# Patient Record
Sex: FEMALE | Race: OTHER | HISPANIC OR LATINO | URBAN - METROPOLITAN AREA
[De-identification: names, ages, dates, MRNs, and addresses within clinical notes are randomized per-mention and may not be internally consistent; named-entity substitution may affect disease eponyms.]

---
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
## 2018-06-16 ENCOUNTER — TRANSCRIPTION ENCOUNTER (OUTPATIENT)
Age: 18
End: 2018-06-16

## 2018-06-16 ENCOUNTER — INPATIENT (INPATIENT)
Facility: HOSPITAL | Age: 18
LOS: 2 days | Discharge: HOME | End: 2018-06-19
Attending: SURGERY | Admitting: SURGERY
Payer: MEDICAID

## 2018-06-16 VITALS
RESPIRATION RATE: 16 BRPM | DIASTOLIC BLOOD PRESSURE: 74 MMHG | HEART RATE: 109 BPM | OXYGEN SATURATION: 100 % | SYSTOLIC BLOOD PRESSURE: 126 MMHG | TEMPERATURE: 98 F

## 2018-06-16 DIAGNOSIS — K80.20 CALCULUS OF GALLBLADDER WITHOUT CHOLECYSTITIS WITHOUT OBSTRUCTION: ICD-10-CM

## 2018-06-16 LAB
ALBUMIN SERPL ELPH-MCNC: 4.3 G/DL — SIGNIFICANT CHANGE UP (ref 3.3–5)
ALP SERPL-CCNC: 56 U/L — SIGNIFICANT CHANGE UP (ref 40–120)
ALT FLD-CCNC: 21 U/L — SIGNIFICANT CHANGE UP (ref 4–33)
AST SERPL-CCNC: 17 U/L — SIGNIFICANT CHANGE UP (ref 4–32)
BASOPHILS # BLD AUTO: 0.02 K/UL — SIGNIFICANT CHANGE UP (ref 0–0.2)
BASOPHILS NFR BLD AUTO: 0.3 % — SIGNIFICANT CHANGE UP (ref 0–2)
BILIRUB SERPL-MCNC: 0.4 MG/DL — SIGNIFICANT CHANGE UP (ref 0.2–1.2)
BUN SERPL-MCNC: 11 MG/DL — SIGNIFICANT CHANGE UP (ref 7–23)
CALCIUM SERPL-MCNC: 9.3 MG/DL — SIGNIFICANT CHANGE UP (ref 8.4–10.5)
CHLORIDE SERPL-SCNC: 100 MMOL/L — SIGNIFICANT CHANGE UP (ref 98–107)
CO2 SERPL-SCNC: 25 MMOL/L — SIGNIFICANT CHANGE UP (ref 22–31)
CREAT SERPL-MCNC: 0.82 MG/DL — SIGNIFICANT CHANGE UP (ref 0.5–1.3)
EOSINOPHIL # BLD AUTO: 0.06 K/UL — SIGNIFICANT CHANGE UP (ref 0–0.5)
EOSINOPHIL NFR BLD AUTO: 0.9 % — SIGNIFICANT CHANGE UP (ref 0–6)
GLUCOSE SERPL-MCNC: 94 MG/DL — SIGNIFICANT CHANGE UP (ref 70–99)
HCG SERPL-ACNC: < 5 MIU/ML — SIGNIFICANT CHANGE UP
HCT VFR BLD CALC: 39.3 % — SIGNIFICANT CHANGE UP (ref 34.5–45)
HGB BLD-MCNC: 12.5 G/DL — SIGNIFICANT CHANGE UP (ref 11.5–15.5)
HIV COMBO RESULT: SIGNIFICANT CHANGE UP
HIV1+2 AB SPEC QL: SIGNIFICANT CHANGE UP
IMM GRANULOCYTES # BLD AUTO: 0.02 # — SIGNIFICANT CHANGE UP
IMM GRANULOCYTES NFR BLD AUTO: 0.3 % — SIGNIFICANT CHANGE UP (ref 0–1.5)
LIDOCAIN IGE QN: 39.8 U/L — SIGNIFICANT CHANGE UP (ref 7–60)
LYMPHOCYTES # BLD AUTO: 1.19 K/UL — SIGNIFICANT CHANGE UP (ref 1–3.3)
LYMPHOCYTES # BLD AUTO: 17.5 % — SIGNIFICANT CHANGE UP (ref 13–44)
MCHC RBC-ENTMCNC: 28.2 PG — SIGNIFICANT CHANGE UP (ref 27–34)
MCHC RBC-ENTMCNC: 31.8 % — LOW (ref 32–36)
MCV RBC AUTO: 88.5 FL — SIGNIFICANT CHANGE UP (ref 80–100)
MONOCYTES # BLD AUTO: 0.36 K/UL — SIGNIFICANT CHANGE UP (ref 0–0.9)
MONOCYTES NFR BLD AUTO: 5.3 % — SIGNIFICANT CHANGE UP (ref 2–14)
NEUTROPHILS # BLD AUTO: 5.15 K/UL — SIGNIFICANT CHANGE UP (ref 1.8–7.4)
NEUTROPHILS NFR BLD AUTO: 75.7 % — SIGNIFICANT CHANGE UP (ref 43–77)
NRBC # FLD: 0 — SIGNIFICANT CHANGE UP
PLATELET # BLD AUTO: 185 K/UL — SIGNIFICANT CHANGE UP (ref 150–400)
PMV BLD: 11.2 FL — SIGNIFICANT CHANGE UP (ref 7–13)
POTASSIUM SERPL-MCNC: 4.1 MMOL/L — SIGNIFICANT CHANGE UP (ref 3.5–5.3)
POTASSIUM SERPL-SCNC: 4.1 MMOL/L — SIGNIFICANT CHANGE UP (ref 3.5–5.3)
PROT SERPL-MCNC: 7.5 G/DL — SIGNIFICANT CHANGE UP (ref 6–8.3)
RBC # BLD: 4.44 M/UL — SIGNIFICANT CHANGE UP (ref 3.8–5.2)
RBC # FLD: 12.4 % — SIGNIFICANT CHANGE UP (ref 10.3–14.5)
SODIUM SERPL-SCNC: 139 MMOL/L — SIGNIFICANT CHANGE UP (ref 135–145)
WBC # BLD: 6.8 K/UL — SIGNIFICANT CHANGE UP (ref 3.8–10.5)
WBC # FLD AUTO: 6.8 K/UL — SIGNIFICANT CHANGE UP (ref 3.8–10.5)

## 2018-06-16 PROCEDURE — 76705 ECHO EXAM OF ABDOMEN: CPT | Mod: 26

## 2018-06-16 PROCEDURE — 99221 1ST HOSP IP/OBS SF/LOW 40: CPT | Mod: 57,GC

## 2018-06-16 RX ORDER — ENOXAPARIN SODIUM 100 MG/ML
40 INJECTION SUBCUTANEOUS EVERY 24 HOURS
Qty: 0 | Refills: 0 | Status: DISCONTINUED | OUTPATIENT
Start: 2018-06-16 | End: 2018-06-19

## 2018-06-16 RX ORDER — ONDANSETRON 8 MG/1
4 TABLET, FILM COATED ORAL ONCE
Qty: 0 | Refills: 0 | Status: COMPLETED | OUTPATIENT
Start: 2018-06-16 | End: 2018-06-16

## 2018-06-16 RX ORDER — CEFOTETAN DISODIUM 1 G
2 VIAL (EA) INJECTION EVERY 12 HOURS
Qty: 0 | Refills: 0 | Status: DISCONTINUED | OUTPATIENT
Start: 2018-06-16 | End: 2018-06-17

## 2018-06-16 RX ORDER — ACETAMINOPHEN 500 MG
650 TABLET ORAL EVERY 6 HOURS
Qty: 0 | Refills: 0 | Status: DISCONTINUED | OUTPATIENT
Start: 2018-06-16 | End: 2018-06-17

## 2018-06-16 RX ORDER — MORPHINE SULFATE 50 MG/1
4 CAPSULE, EXTENDED RELEASE ORAL ONCE
Qty: 0 | Refills: 0 | Status: DISCONTINUED | OUTPATIENT
Start: 2018-06-16 | End: 2018-06-16

## 2018-06-16 RX ORDER — SODIUM CHLORIDE 9 MG/ML
1000 INJECTION, SOLUTION INTRAVENOUS
Qty: 0 | Refills: 0 | Status: DISCONTINUED | OUTPATIENT
Start: 2018-06-16 | End: 2018-06-17

## 2018-06-16 RX ORDER — SODIUM CHLORIDE 9 MG/ML
1000 INJECTION INTRAMUSCULAR; INTRAVENOUS; SUBCUTANEOUS ONCE
Qty: 0 | Refills: 0 | Status: COMPLETED | OUTPATIENT
Start: 2018-06-16 | End: 2018-06-16

## 2018-06-16 RX ADMIN — SODIUM CHLORIDE 125 MILLILITER(S): 9 INJECTION, SOLUTION INTRAVENOUS at 16:14

## 2018-06-16 RX ADMIN — SODIUM CHLORIDE 125 MILLILITER(S): 9 INJECTION, SOLUTION INTRAVENOUS at 18:31

## 2018-06-16 RX ADMIN — Medication 100 GRAM(S): at 15:37

## 2018-06-16 RX ADMIN — ONDANSETRON 4 MILLIGRAM(S): 8 TABLET, FILM COATED ORAL at 09:54

## 2018-06-16 RX ADMIN — SODIUM CHLORIDE 2000 MILLILITER(S): 9 INJECTION INTRAMUSCULAR; INTRAVENOUS; SUBCUTANEOUS at 09:54

## 2018-06-16 RX ADMIN — MORPHINE SULFATE 4 MILLIGRAM(S): 50 CAPSULE, EXTENDED RELEASE ORAL at 10:11

## 2018-06-16 RX ADMIN — Medication 650 MILLIGRAM(S): at 18:31

## 2018-06-16 NOTE — H&P ADULT - NSHPPHYSICALEXAM_GEN_ALL_CORE
Vital Signs Last 24 Hrs  T(C): 36.8 (16 Jun 2018 10:11), Max: 36.9 (16 Jun 2018 08:35)  T(F): 98.2 (16 Jun 2018 10:11), Max: 98.5 (16 Jun 2018 08:35)  HR: 104 (16 Jun 2018 10:11) (104 - 109)  BP: 122/72 (16 Jun 2018 10:11) (122/72 - 126/74)  BP(mean): --  RR: 16 (16 Jun 2018 10:11) (16 - 16)  SpO2: 100% (16 Jun 2018 10:11) (100% - 100%)    General: well developed, well nourished, NAD  Neuro: alert and oriented, no focal deficits, moves all extremities spontaneously  HEENT: NCAT, EOMI, anicteric, mucosa moist  Respiratory: airway patent, respirations unlabored  CVS: regular rate and rhythm  Abdomen: soft, nondistended, tender in RUQ, negative Calloway's (recently medicated), no rebound  Extremities: no edema, sensation and movement grossly intact  Skin: warm, dry, appropriate color

## 2018-06-16 NOTE — ED ADULT NURSE NOTE - OBJECTIVE STATEMENT
Pt presents to the ER with c/o abd pain since last night. Alert and oriented x 3, ambulatory. IVL placed. Bloods drawn. Mother at bedside.

## 2018-06-16 NOTE — H&P ADULT - NSHPSOCIALHISTORY_GEN_ALL_CORE
Never smoker, occasional EtOH use (once per month, 4-5 drinks), denies illicit substance use; lives at home with mother and father, works as a , presently in school to become a .

## 2018-06-16 NOTE — H&P ADULT - NSHPLABSRESULTS_GEN_ALL_CORE
Lab             12.5   6.80  )-----------( 185      ( 16 Jun 2018 09:41 )             39.3   06-16    139  |  100  |  11  ----------------------------<  94  4.1   |  25  |  0.82    Ca    9.3      16 Jun 2018 09:41    TPro  7.5  /  Alb  4.3  /  TBili  0.4  /  DBili  x   /  AST  17  /  ALT  21  /  AlkPhos  56  06-16    Imaging  < from: US Abdomen Limited (06.16.18 @ 11:27) >    Gallbladder: Small gallstone at gallbladder neck. Presence of Calloway's sign could not be assessed for as patient was premedicated with analgesics prior to the examination.    < end of copied text >

## 2018-06-16 NOTE — H&P ADULT - HISTORY OF PRESENT ILLNESS
HPI: 17 year old female presenting with 1 day history of abdominal pain, associated with mild nausea and chills, no emesis. Patient states she had pain that started in the epigastrium, and radiated around the R flank into the back. It was sharp and severe, and caused her to be nauseated, without any vomiting. Denies change in bowel habits. Never had pain like this before. No fevers, but now with shaking chills. Denies palpitations, chest pain. Patient states she was recently started on an oral contraceptive (1-2 weeks ago) as part of the workup for her amenorrhea (since February), and was concerned that this may be related.     PMHx: No pertinent past medical history  PSHx: No significant past surgical history    Medications (home): oral contraceptives  Allergies: No Known Allergies  (Intolerances: none)  Social Hx: Never smoker, occasional EtOH use (once per month, 4-5 drinks), denies illicit substance use; lives at home with mother and father, works as a , presently in school to become a .  Family Hx: no known history of bleeding disorder or adverse reaction to anesthesia, patient reports family member with low thyroid    Physical exam significant for abdominal tenderness in the RUQ    Imaging and labs remarkable for cholelithiasis without evidence of biliary dilation, normal hepatic function panel

## 2018-06-16 NOTE — ED ADULT TRIAGE NOTE - CHIEF COMPLAINT QUOTE
c/o right sided abdominal pain that began last night denies n/v/d LMP February states took pregnant test and is negative has irregular periods

## 2018-06-16 NOTE — ED PROVIDER NOTE - OBJECTIVE STATEMENT
16 y/o F no PMHx or PSHx presents to the ED complaining of R upper quadrant pain abdominal pain and mild nausea that began yesterday. Patient states that the pain came on upon wakening up but did not wake her while she was sleeping and became worse while showering. The Patient also states that she has not eaten or drank since the onset of the pain yesterday. Pain is non-radiating and is waxing and waning in severity. Patient endorses irregular menstruations as she takes oral contraceptives pills and finished the dosing recently. Patient has not had her menstrual period. Patient recently traveled to  with no known bad food or symptoms since her return yesterday. Denies fevers/chills, vomiting, dysuria or any other related symptoms.

## 2018-06-16 NOTE — ED PROVIDER NOTE - MEDICAL DECISION MAKING DETAILS
17/ y/o R upper abdomen quadrant pain without fevers/chills, nausea without diarrhea. Will give IV fluids, US of R upper abdomen. Send UA and give pregnancy test.

## 2018-06-16 NOTE — H&P ADULT - ASSESSMENT
17 year old female with cholelithiasis, abdominal tenderness concerning for acute cholecystitis.    - Admit to surgery, B team  - NPO  - IV fluid while NPO  - antibiotics with cefotetan  - will plan on OR for laparoscopic cholecystectomy    Discussed with Dr. Marques  --MILI Bowman, [id number]

## 2018-06-16 NOTE — ED PROVIDER NOTE - NS_ ATTENDINGSCRIBEDETAILS _ED_A_ED_FT
The scribe's documentation has been prepared under my direction and personally reviewed by me, Elza Romero MD, in its entirety. I confirm that the note above accurately reflects all work, treatment, procedures, and medical decision making performed by me.

## 2018-06-16 NOTE — ED PROVIDER NOTE - PROGRESS NOTE DETAILS
ROSALBA LOPEZ, MD: D/W patient given pain resolved and normal labs, surgery will possibly recommend outpatient f/u, but patient would feel more comfortable with surgical evaluation.  Surgery paged. Endorsed from Dr Romero, pt to be admitted to surgery Dr Marques. pt care transferred.

## 2018-06-17 ENCOUNTER — RESULT REVIEW (OUTPATIENT)
Age: 18
End: 2018-06-17

## 2018-06-17 ENCOUNTER — TRANSCRIPTION ENCOUNTER (OUTPATIENT)
Age: 18
End: 2018-06-17

## 2018-06-17 LAB
ALBUMIN SERPL ELPH-MCNC: 3.7 G/DL — SIGNIFICANT CHANGE UP (ref 3.3–5)
ALP SERPL-CCNC: 51 U/L — SIGNIFICANT CHANGE UP (ref 40–120)
ALT FLD-CCNC: 15 U/L — SIGNIFICANT CHANGE UP (ref 4–33)
AST SERPL-CCNC: 14 U/L — SIGNIFICANT CHANGE UP (ref 4–32)
BASOPHILS # BLD AUTO: 0.03 K/UL — SIGNIFICANT CHANGE UP (ref 0–0.2)
BASOPHILS NFR BLD AUTO: 0.5 % — SIGNIFICANT CHANGE UP (ref 0–2)
BILIRUB DIRECT SERPL-MCNC: 0.1 MG/DL — SIGNIFICANT CHANGE UP (ref 0.1–0.2)
BILIRUB SERPL-MCNC: 0.5 MG/DL — SIGNIFICANT CHANGE UP (ref 0.2–1.2)
BLD GP AB SCN SERPL QL: NEGATIVE — SIGNIFICANT CHANGE UP
BUN SERPL-MCNC: 9 MG/DL — SIGNIFICANT CHANGE UP (ref 7–23)
CALCIUM SERPL-MCNC: 8.9 MG/DL — SIGNIFICANT CHANGE UP (ref 8.4–10.5)
CHLORIDE SERPL-SCNC: 101 MMOL/L — SIGNIFICANT CHANGE UP (ref 98–107)
CO2 SERPL-SCNC: 23 MMOL/L — SIGNIFICANT CHANGE UP (ref 22–31)
CREAT SERPL-MCNC: 0.87 MG/DL — SIGNIFICANT CHANGE UP (ref 0.5–1.3)
EOSINOPHIL # BLD AUTO: 0.1 K/UL — SIGNIFICANT CHANGE UP (ref 0–0.5)
EOSINOPHIL NFR BLD AUTO: 1.7 % — SIGNIFICANT CHANGE UP (ref 0–6)
GLUCOSE SERPL-MCNC: 68 MG/DL — LOW (ref 70–99)
HCT VFR BLD CALC: 35.7 % — SIGNIFICANT CHANGE UP (ref 34.5–45)
HGB BLD-MCNC: 11.5 G/DL — SIGNIFICANT CHANGE UP (ref 11.5–15.5)
IMM GRANULOCYTES # BLD AUTO: 0.02 # — SIGNIFICANT CHANGE UP
IMM GRANULOCYTES NFR BLD AUTO: 0.3 % — SIGNIFICANT CHANGE UP (ref 0–1.5)
LYMPHOCYTES # BLD AUTO: 1.82 K/UL — SIGNIFICANT CHANGE UP (ref 1–3.3)
LYMPHOCYTES # BLD AUTO: 31.8 % — SIGNIFICANT CHANGE UP (ref 13–44)
MAGNESIUM SERPL-MCNC: 1.8 MG/DL — SIGNIFICANT CHANGE UP (ref 1.6–2.6)
MCHC RBC-ENTMCNC: 28.7 PG — SIGNIFICANT CHANGE UP (ref 27–34)
MCHC RBC-ENTMCNC: 32.2 % — SIGNIFICANT CHANGE UP (ref 32–36)
MCV RBC AUTO: 89 FL — SIGNIFICANT CHANGE UP (ref 80–100)
MONOCYTES # BLD AUTO: 0.44 K/UL — SIGNIFICANT CHANGE UP (ref 0–0.9)
MONOCYTES NFR BLD AUTO: 7.7 % — SIGNIFICANT CHANGE UP (ref 2–14)
NEUTROPHILS # BLD AUTO: 3.32 K/UL — SIGNIFICANT CHANGE UP (ref 1.8–7.4)
NEUTROPHILS NFR BLD AUTO: 58 % — SIGNIFICANT CHANGE UP (ref 43–77)
NRBC # FLD: 0 — SIGNIFICANT CHANGE UP
PLATELET # BLD AUTO: 169 K/UL — SIGNIFICANT CHANGE UP (ref 150–400)
PMV BLD: 11.6 FL — SIGNIFICANT CHANGE UP (ref 7–13)
POTASSIUM SERPL-MCNC: 4.4 MMOL/L — SIGNIFICANT CHANGE UP (ref 3.5–5.3)
POTASSIUM SERPL-SCNC: 4.4 MMOL/L — SIGNIFICANT CHANGE UP (ref 3.5–5.3)
PROT SERPL-MCNC: 6.4 G/DL — SIGNIFICANT CHANGE UP (ref 6–8.3)
RBC # BLD: 4.01 M/UL — SIGNIFICANT CHANGE UP (ref 3.8–5.2)
RBC # FLD: 12.5 % — SIGNIFICANT CHANGE UP (ref 10.3–14.5)
RH IG SCN BLD-IMP: POSITIVE — SIGNIFICANT CHANGE UP
SODIUM SERPL-SCNC: 139 MMOL/L — SIGNIFICANT CHANGE UP (ref 135–145)
WBC # BLD: 5.73 K/UL — SIGNIFICANT CHANGE UP (ref 3.8–10.5)
WBC # FLD AUTO: 5.73 K/UL — SIGNIFICANT CHANGE UP (ref 3.8–10.5)

## 2018-06-17 PROCEDURE — 47562 LAPAROSCOPIC CHOLECYSTECTOMY: CPT | Mod: GC

## 2018-06-17 PROCEDURE — 88304 TISSUE EXAM BY PATHOLOGIST: CPT | Mod: 26

## 2018-06-17 RX ORDER — OXYCODONE HYDROCHLORIDE 5 MG/1
10 TABLET ORAL EVERY 6 HOURS
Qty: 0 | Refills: 0 | Status: DISCONTINUED | OUTPATIENT
Start: 2018-06-17 | End: 2018-06-19

## 2018-06-17 RX ORDER — ACETAMINOPHEN 500 MG
650 TABLET ORAL EVERY 6 HOURS
Qty: 0 | Refills: 0 | Status: DISCONTINUED | OUTPATIENT
Start: 2018-06-17 | End: 2018-06-19

## 2018-06-17 RX ORDER — HYDROMORPHONE HYDROCHLORIDE 2 MG/ML
0.5 INJECTION INTRAMUSCULAR; INTRAVENOUS; SUBCUTANEOUS
Qty: 0 | Refills: 0 | Status: DISCONTINUED | OUTPATIENT
Start: 2018-06-17 | End: 2018-06-17

## 2018-06-17 RX ORDER — HYDROMORPHONE HYDROCHLORIDE 2 MG/ML
0.5 INJECTION INTRAMUSCULAR; INTRAVENOUS; SUBCUTANEOUS
Qty: 0 | Refills: 0 | Status: DISCONTINUED | OUTPATIENT
Start: 2018-06-17 | End: 2018-06-19

## 2018-06-17 RX ORDER — OXYCODONE HYDROCHLORIDE 5 MG/1
5 TABLET ORAL EVERY 4 HOURS
Qty: 0 | Refills: 0 | Status: DISCONTINUED | OUTPATIENT
Start: 2018-06-17 | End: 2018-06-19

## 2018-06-17 RX ORDER — ONDANSETRON 8 MG/1
4 TABLET, FILM COATED ORAL ONCE
Qty: 0 | Refills: 0 | Status: COMPLETED | OUTPATIENT
Start: 2018-06-17 | End: 2018-06-17

## 2018-06-17 RX ORDER — SODIUM CHLORIDE 9 MG/ML
1000 INJECTION, SOLUTION INTRAVENOUS
Qty: 0 | Refills: 0 | Status: DISCONTINUED | OUTPATIENT
Start: 2018-06-17 | End: 2018-06-17

## 2018-06-17 RX ORDER — ONDANSETRON 8 MG/1
4 TABLET, FILM COATED ORAL ONCE
Qty: 0 | Refills: 0 | Status: DISCONTINUED | OUTPATIENT
Start: 2018-06-17 | End: 2018-06-19

## 2018-06-17 RX ADMIN — Medication 100 GRAM(S): at 03:20

## 2018-06-17 RX ADMIN — ONDANSETRON 4 MILLIGRAM(S): 8 TABLET, FILM COATED ORAL at 00:53

## 2018-06-17 RX ADMIN — SODIUM CHLORIDE 75 MILLILITER(S): 9 INJECTION, SOLUTION INTRAVENOUS at 14:00

## 2018-06-17 RX ADMIN — SODIUM CHLORIDE 125 MILLILITER(S): 9 INJECTION, SOLUTION INTRAVENOUS at 00:00

## 2018-06-17 RX ADMIN — Medication 100 GRAM(S): at 03:21

## 2018-06-17 RX ADMIN — OXYCODONE HYDROCHLORIDE 10 MILLIGRAM(S): 5 TABLET ORAL at 14:44

## 2018-06-17 RX ADMIN — OXYCODONE HYDROCHLORIDE 10 MILLIGRAM(S): 5 TABLET ORAL at 15:30

## 2018-06-17 RX ADMIN — OXYCODONE HYDROCHLORIDE 10 MILLIGRAM(S): 5 TABLET ORAL at 21:24

## 2018-06-17 RX ADMIN — ENOXAPARIN SODIUM 40 MILLIGRAM(S): 100 INJECTION SUBCUTANEOUS at 14:00

## 2018-06-17 RX ADMIN — OXYCODONE HYDROCHLORIDE 10 MILLIGRAM(S): 5 TABLET ORAL at 20:54

## 2018-06-17 NOTE — DISCHARGE NOTE ADULT - CARE PROVIDERS DIRECT ADDRESSES
,[email or web address]_Unknown

## 2018-06-17 NOTE — BRIEF OPERATIVE NOTE - OPERATION/FINDINGS
Procedure: Laparoscopic cholecystectomy    Findings: An infraumbilical incision was made and carried down through the fascia. Additional trocars were placed. The cystic duct and artery were identified and ligated. Gallbladder was removed. Fascia and skin were then closed.

## 2018-06-17 NOTE — DISCHARGE NOTE ADULT - CARE PROVIDER_API CALL
Kd Marques), DieticianNutrition; Surgery; Surgical Critical Care  [address]  [address], [place] [address]  Phone: [phone]  Fax: [phone] Kd Marques), DieticianNutrition; Surgery; Surgical Critical Care  [address]  [address]  [place] [address]  Phone: [phone]  Fax: [phone]    Tram Deleon), DermatologyDermatopathology  [address] [place] [address]  Phone: [phone]  Fax: [phone]

## 2018-06-17 NOTE — DISCHARGE NOTE ADULT - MEDICATION SUMMARY - MEDICATIONS TO TAKE
I will START or STAY ON the medications listed below when I get home from the hospital:    oxyCODONE-acetaminophen 5 mg-325 mg oral tablet  -- 1 tab(s) by mouth every 6 hours, As Needed -for moderate pain MDD:4 tabs   -- Caution federal law prohibits the transfer of this drug to any person other  than the person for whom it was prescribed.  May cause drowsiness.  Alcohol may intensify this effect.  Use care when operating dangerous machinery.  This prescription cannot be refilled.  This product contains acetaminophen.  Do not use  with any other product containing acetaminophen to prevent possible liver damage.  Using more of this medication than prescribed may cause serious breathing problems.    -- Indication: For Pain I will START or STAY ON the medications listed below when I get home from the hospital:    oxyCODONE-acetaminophen 5 mg-325 mg oral tablet  -- 1 tab(s) by mouth every 6 hours, As Needed -for moderate pain MDD:4 tabs   -- Caution federal law prohibits the transfer of this drug to any person other  than the person for whom it was prescribed.  May cause drowsiness.  Alcohol may intensify this effect.  Use care when operating dangerous machinery.  This prescription cannot be refilled.  This product contains acetaminophen.  Do not use  with any other product containing acetaminophen to prevent possible liver damage.  Using more of this medication than prescribed may cause serious breathing problems.    -- Indication: For Pain med    diphenhydrAMINE 25 mg oral capsule  -- 1 cap(s) by mouth once a day (at bedtime)  -- Indication: For rash     ZyrTEC 10 mg oral tablet  -- 1 tab(s) by mouth 2 times a day (in morning and at lunch)  -- Indication: For rash    triamcinolone 0.1% topical cream  -- 1 application on skin 2 times a day  -- Indication: For rash

## 2018-06-17 NOTE — DISCHARGE NOTE ADULT - HOSPITAL COURSE
17 F presents with abdominal pain. Found to have cholelithiasis on ultrasound. Given persistent pain and tenderness, patient underwent a laparoscopic cholecystectomy with Dr. Marques. After surgery, patient was tolerating a diet, ambulating, voiding, and her pain was controlled. Patient was discharged home, and instructed follow up with Dr. Marques within 1 week. 17 F presents with abdominal pain, found to have cholelithiasis on ultrasound. Given persistent pain and tenderness, patient underwent a laparoscopic cholecystectomy with Dr. Marques on 6/17. Patient tolerated procedure well. Pt's diet slowly advanced as tolerated.     Post op pt developed a rash for which Dermatology consulted and recommend Triamcinolone ointment .1% BID. Zyrtec 10 mg BID (AM and lunch time). Benadryl 25 mg QHS. Patient educated on signs of more serious drug reaction including: Eye pain/grittiness, oral lesions, skin sloughing, dysuria/genital lesions, pain on defecation/anal lesions, facial swelling, lymphadenopathy, fevers. Pt to follow-up with Dr. Deleon in [facility] on Thursday as an outpatient     Per Attending patient stable for discharge, Pt tolerating a diet, ambulating, voiding, and her pain was controlled. Patient was discharged home, and instructed follow up with Dr. Marques within 1 week.

## 2018-06-17 NOTE — PROGRESS NOTE ADULT - SUBJECTIVE AND OBJECTIVE BOX
GENERAL SURGERY DAILY PROGRESS NOTE:       Subjective:  Pain controlled overnight however pt still reports RUQ pain. Denies N/V. NPO for today.         Objective:    PE:  Gen: NAD  Abd: soft, ND, TTP in RUQ, no rebound or guarding      Vital Signs Last 24 Hrs  T(C): 37.5 (16 Jun 2018 21:45), Max: 37.5 (16 Jun 2018 21:45)  T(F): 99.5 (16 Jun 2018 21:45), Max: 99.5 (16 Jun 2018 21:45)  HR: 88 (16 Jun 2018 21:45) (70 - 109)  BP: 122/74 (16 Jun 2018 21:45) (104/60 - 126/74)  BP(mean): --  RR: 17 (16 Jun 2018 21:45) (16 - 17)  SpO2: 100% (16 Jun 2018 21:45) (100% - 100%)    I&O's Detail    16 Jun 2018 07:01  -  17 Jun 2018 00:42  --------------------------------------------------------  IN:    lactated ringers: 575 mL  Total IN: 575 mL    OUT:    Voided: 650 mL  Total OUT: 650 mL    Total NET: -75 mL

## 2018-06-17 NOTE — DISCHARGE NOTE ADULT - CARE PLAN
Principal Discharge DX:	Cholecystitis  Goal:	Follow up with your surgeon  Assessment and plan of treatment:	WOUND CARE: The Dermabond on your skin will come off by itself.  BATHING: Please do not submerge wound underwater. You may shower and/or sponge bathe.  ACTIVITY: No heavy lifting or straining. Otherwise, you may return to your usual level of physical activity. If you are taking narcotic pain medication (such as Percocet), do NOT drive a car, operate machinery or make important decisions.  DIET: Return to your usual diet.  NOTIFY YOUR SURGEON IF: You have any bleeding that does not stop, any pus draining from your wound, any fever (over 100.4 F) or chills, persistent nausea/vomiting, persistent diarrhea, or if your pain is not controlled on your discharge pain medications.  FOLLOW-UP:  1. Please call to make a follow-up appointment within one week of discharge with Dr. Marques ([phone]). Principal Discharge DX:	Cholecystitis  Goal:	s/p laparoscopic cholecystectomy  Assessment and plan of treatment:	WOUND CARE: The Dermabond on your skin will come off by itself.  BATHING: Please do not submerge wound underwater. You may shower and/or sponge bathe.  ACTIVITY: No heavy lifting or straining. Otherwise, you may return to your usual level of physical activity. If you are taking narcotic pain medication (such as Percocet), do NOT drive a car, operate machinery or make important decisions.  DIET: Return to your usual diet.  NOTIFY YOUR SURGEON IF: You have any bleeding that does not stop, any pus draining from your wound, any fever (over 100.4 F) or chills, persistent nausea/vomiting, persistent diarrhea, or if your pain is not controlled on your discharge pain medications.  FOLLOW-UP:  1. Please call to make a follow-up appointment within one week of discharge with Dr. Marques ([phone]).  While in the hospital you developed a rash for which Dermatology consulted and recommend topical ointment Triamcinolone ointment .1% to be used twice a day, Zyrtec 10 mg  (AM and lunch time),  Benadryl 25 mg at bedtime. Please follow up with Dermatologist Dr. Deleon on Thursday in [facility], please call [phone] to schedule appointment.  You were educated on signs of more serious drug reaction including Eye pain/grittiness, oral lesions, skin sloughing, genital lesions, pain with urination, pain with bowel movements/anal lesions, facial swelling, swollen glands, or fevers. If you note any of these please contact your doctor and/or return to emergency room

## 2018-06-17 NOTE — DISCHARGE NOTE ADULT - PATIENT PORTAL LINK FT
You can access the [facility] Patient Portal, offered by [facility], by registering with the following website: [email or web address]

## 2018-06-17 NOTE — BRIEF OPERATIVE NOTE - PROCEDURE
<<-----Click on this checkbox to enter Procedure Laparoscopic cholecystectomy  06/17/2018    Active  BONG

## 2018-06-17 NOTE — DISCHARGE NOTE ADULT - PLAN OF CARE
Follow up with your surgeon WOUND CARE: The Dermabond on your skin will come off by itself.  BATHING: Please do not submerge wound underwater. You may shower and/or sponge bathe.  ACTIVITY: No heavy lifting or straining. Otherwise, you may return to your usual level of physical activity. If you are taking narcotic pain medication (such as Percocet), do NOT drive a car, operate machinery or make important decisions.  DIET: Return to your usual diet.  NOTIFY YOUR SURGEON IF: You have any bleeding that does not stop, any pus draining from your wound, any fever (over 100.4 F) or chills, persistent nausea/vomiting, persistent diarrhea, or if your pain is not controlled on your discharge pain medications.  FOLLOW-UP:  1. Please call to make a follow-up appointment within one week of discharge with Dr. Marques ([phone]). WOUND CARE: The Dermabond on your skin will come off by itself.  BATHING: Please do not submerge wound underwater. You may shower and/or sponge bathe.  ACTIVITY: No heavy lifting or straining. Otherwise, you may return to your usual level of physical activity. If you are taking narcotic pain medication (such as Percocet), do NOT drive a car, operate machinery or make important decisions.  DIET: Return to your usual diet.  NOTIFY YOUR SURGEON IF: You have any bleeding that does not stop, any pus draining from your wound, any fever (over 100.4 F) or chills, persistent nausea/vomiting, persistent diarrhea, or if your pain is not controlled on your discharge pain medications.  FOLLOW-UP:  1. Please call to make a follow-up appointment within one week of discharge with Dr. Marques ([phone]).  While in the hospital you developed a rash for which Dermatology consulted and recommend topical ointment Triamcinolone ointment .1% to be used twice a day, Zyrtec 10 mg  (AM and lunch time),  Benadryl 25 mg at bedtime. Please follow up with Dermatologist Dr. Deleon on Thursday in [facility], please call [phone] to schedule appointment.  You were educated on signs of more serious drug reaction including Eye pain/grittiness, oral lesions, skin sloughing, genital lesions, pain with urination, pain with bowel movements/anal lesions, facial swelling, swollen glands, or fevers. If you note any of these please contact your doctor and/or return to emergency room s/p laparoscopic cholecystectomy

## 2018-06-18 RX ORDER — DIPHENHYDRAMINE HCL 50 MG
25 CAPSULE ORAL EVERY 6 HOURS
Qty: 0 | Refills: 0 | Status: DISCONTINUED | OUTPATIENT
Start: 2018-06-18 | End: 2018-06-19

## 2018-06-18 RX ORDER — HYDROCORTISONE 1 %
1 OINTMENT (GRAM) TOPICAL
Qty: 0 | Refills: 0 | Status: DISCONTINUED | OUTPATIENT
Start: 2018-06-18 | End: 2018-06-19

## 2018-06-18 RX ADMIN — OXYCODONE HYDROCHLORIDE 10 MILLIGRAM(S): 5 TABLET ORAL at 13:01

## 2018-06-18 RX ADMIN — OXYCODONE HYDROCHLORIDE 10 MILLIGRAM(S): 5 TABLET ORAL at 05:52

## 2018-06-18 RX ADMIN — OXYCODONE HYDROCHLORIDE 10 MILLIGRAM(S): 5 TABLET ORAL at 13:31

## 2018-06-18 RX ADMIN — Medication 25 MILLIGRAM(S): at 20:58

## 2018-06-18 RX ADMIN — ENOXAPARIN SODIUM 40 MILLIGRAM(S): 100 INJECTION SUBCUTANEOUS at 13:00

## 2018-06-18 RX ADMIN — Medication 1 APPLICATION(S): at 22:17

## 2018-06-18 RX ADMIN — OXYCODONE HYDROCHLORIDE 10 MILLIGRAM(S): 5 TABLET ORAL at 06:50

## 2018-06-18 NOTE — PROGRESS NOTE ADULT - ATTENDING COMMENTS
Seen and examined.    PD#1    s/p lap cholecystectomy  a.  Diet as tolerated  b.  Encourage ambulation, spirometry  c.  Decrease IVF, continue DVT prophylaxis  d.  Transition to PO analgesia

## 2018-06-18 NOTE — PROGRESS NOTE ADULT - SUBJECTIVE AND OBJECTIVE BOX
GENERAL SURGERY DAILY PROGRESS NOTE:     Subjective:  Patient seen and examined, pain is well controlled, tolerating a diet without N/V.      Objective:  T(C): 36.7 (06-18-18 @ 05:51), Max: 36.9 (06-17-18 @ 11:15)  HR: 73 (06-18-18 @ 05:51) (68 - 105)  BP: 100/60 (06-18-18 @ 05:51) (100/60 - 126/69)  RR: 18 (06-18-18 @ 05:51) (14 - 20)  SpO2: 100% (06-18-18 @ 05:51) (98% - 100%)  Wt(kg): --    06-17 @ 07:01  -  06-18 @ 07:00  --------------------------------------------------------  IN:    lactated ringers.: 435 mL    Oral Fluid: 300 mL  Total IN: 735 mL    OUT:    Voided: 1500 mL  Total OUT: 1500 mL    Total NET: -765 mL      PE:  Gen: NAD  Abd: soft, ND, TTP in RUQ, no rebound or guarding                              11.5   5.73  )-----------( 169      ( 17 Jun 2018 07:05 )             35.7     06-17    139  |  101  |  9   ----------------------------<  68<L>  4.4   |  23  |  0.87    Ca    8.9      17 Jun 2018 07:05  Mg     1.8     06-17    TPro  6.4  /  Alb  3.7  /  TBili  0.5  /  DBili  0.1  /  AST  14  /  ALT  15  /  AlkPhos  51  06-17    LIVER FUNCTIONS - ( 17 Jun 2018 07:05 )  Alb: 3.7 g/dL / Pro: 6.4 g/dL / ALK PHOS: 51 u/L / ALT: 15 u/L / AST: 14 u/L / GGT: x

## 2018-06-18 NOTE — PROGRESS NOTE ADULT - ASSESSMENT
ANESTHESIA POSTOP CHECK    17y Female POSTOP DAY 1   Vital Signs Last 24 Hrs  T(C): 37.4 (18 Jun 2018 10:34), Max: 37.4 (18 Jun 2018 10:34)  T(F): 99.4 (18 Jun 2018 10:34), Max: 99.4 (18 Jun 2018 10:34)  HR: 87 (18 Jun 2018 10:34) (68 - 105)  BP: 102/54 (18 Jun 2018 10:34) (100/60 - 126/69)  BP(mean): --  RR: 18 (18 Jun 2018 10:34) (14 - 20)  SpO2: 100% (18 Jun 2018 10:34) (98% - 100%)  I&O's Summary    17 Jun 2018 07:01  -  18 Jun 2018 07:00  --------------------------------------------------------  IN: 735 mL / OUT: 1500 mL / NET: -765 mL    18 Jun 2018 07:01  -  18 Jun 2018 10:47  --------------------------------------------------------  IN: 120 mL / OUT: 150 mL / NET: -30 mL        [x ] NO APPARENT ANESTHESIA COMPLICATIONS      Comments:
51F with acute cholecystitis s/p lap stacy, doing well.    - Reg diet  - pain control  - DVT ppx  - d/c home today
51F with acute cholecystitis.     - NPO/IVF  - pain control  - DVT ppx  - NPO for OR  - pt has been consented

## 2018-06-19 VITALS
SYSTOLIC BLOOD PRESSURE: 112 MMHG | OXYGEN SATURATION: 94 % | RESPIRATION RATE: 18 BRPM | TEMPERATURE: 98 F | DIASTOLIC BLOOD PRESSURE: 69 MMHG | HEART RATE: 84 BPM

## 2018-06-19 PROBLEM — Z00.00 ENCOUNTER FOR PREVENTIVE HEALTH EXAMINATION: Status: ACTIVE | Noted: 2018-06-19

## 2018-06-19 LAB
BUN SERPL-MCNC: 12 MG/DL — SIGNIFICANT CHANGE UP (ref 7–23)
CALCIUM SERPL-MCNC: 9 MG/DL — SIGNIFICANT CHANGE UP (ref 8.4–10.5)
CHLORIDE SERPL-SCNC: 101 MMOL/L — SIGNIFICANT CHANGE UP (ref 98–107)
CO2 SERPL-SCNC: 28 MMOL/L — SIGNIFICANT CHANGE UP (ref 22–31)
CREAT SERPL-MCNC: 0.98 MG/DL — SIGNIFICANT CHANGE UP (ref 0.5–1.3)
GLUCOSE SERPL-MCNC: 89 MG/DL — SIGNIFICANT CHANGE UP (ref 70–99)
HCT VFR BLD CALC: 34.2 % — LOW (ref 34.5–45)
HGB BLD-MCNC: 10.9 G/DL — LOW (ref 11.5–15.5)
MAGNESIUM SERPL-MCNC: 1.8 MG/DL — SIGNIFICANT CHANGE UP (ref 1.6–2.6)
MCHC RBC-ENTMCNC: 28.5 PG — SIGNIFICANT CHANGE UP (ref 27–34)
MCHC RBC-ENTMCNC: 31.9 % — LOW (ref 32–36)
MCV RBC AUTO: 89.5 FL — SIGNIFICANT CHANGE UP (ref 80–100)
NRBC # FLD: 0 — SIGNIFICANT CHANGE UP
PHOSPHATE SERPL-MCNC: 4.2 MG/DL — SIGNIFICANT CHANGE UP (ref 2.5–4.5)
PLATELET # BLD AUTO: 178 K/UL — SIGNIFICANT CHANGE UP (ref 150–400)
PMV BLD: 11.3 FL — SIGNIFICANT CHANGE UP (ref 7–13)
POTASSIUM SERPL-MCNC: 4.2 MMOL/L — SIGNIFICANT CHANGE UP (ref 3.5–5.3)
POTASSIUM SERPL-SCNC: 4.2 MMOL/L — SIGNIFICANT CHANGE UP (ref 3.5–5.3)
RBC # BLD: 3.82 M/UL — SIGNIFICANT CHANGE UP (ref 3.8–5.2)
RBC # FLD: 12.9 % — SIGNIFICANT CHANGE UP (ref 10.3–14.5)
SODIUM SERPL-SCNC: 138 MMOL/L — SIGNIFICANT CHANGE UP (ref 135–145)
WBC # BLD: 7.61 K/UL — SIGNIFICANT CHANGE UP (ref 3.8–10.5)
WBC # FLD AUTO: 7.61 K/UL — SIGNIFICANT CHANGE UP (ref 3.8–10.5)

## 2018-06-19 RX ORDER — MAGNESIUM SULFATE 500 MG/ML
2 VIAL (ML) INJECTION ONCE
Qty: 0 | Refills: 0 | Status: COMPLETED | OUTPATIENT
Start: 2018-06-19 | End: 2018-06-19

## 2018-06-19 RX ORDER — DIPHENHYDRAMINE HCL 50 MG
1 CAPSULE ORAL
Qty: 0 | Refills: 0 | COMMUNITY
Start: 2018-06-19

## 2018-06-19 RX ORDER — CETIRIZINE HYDROCHLORIDE 10 MG/1
1 TABLET ORAL
Qty: 0 | Refills: 0 | COMMUNITY

## 2018-06-19 RX ORDER — DIPHENHYDRAMINE HCL 50 MG
25 CAPSULE ORAL AT BEDTIME
Qty: 0 | Refills: 0 | Status: DISCONTINUED | OUTPATIENT
Start: 2018-06-19 | End: 2018-06-19

## 2018-06-19 RX ORDER — LORATADINE 10 MG/1
10 TABLET ORAL DAILY
Qty: 0 | Refills: 0 | Status: DISCONTINUED | OUTPATIENT
Start: 2018-06-19 | End: 2018-06-19

## 2018-06-19 RX ADMIN — OXYCODONE HYDROCHLORIDE 10 MILLIGRAM(S): 5 TABLET ORAL at 03:30

## 2018-06-19 RX ADMIN — Medication 1 APPLICATION(S): at 07:03

## 2018-06-19 RX ADMIN — Medication 50 GRAM(S): at 11:57

## 2018-06-19 RX ADMIN — ENOXAPARIN SODIUM 40 MILLIGRAM(S): 100 INJECTION SUBCUTANEOUS at 11:57

## 2018-06-19 RX ADMIN — OXYCODONE HYDROCHLORIDE 10 MILLIGRAM(S): 5 TABLET ORAL at 02:37

## 2018-06-19 NOTE — CHART NOTE - NSCHARTNOTEFT_GEN_A_CORE
Pt was seen and examined by dermatology resident and discussed with attending remotely.  Recommendations were verbally communicated to the primary team.    Triamcinolone ointment .1% BID. Zyrtec 10 mg BID (AM and lunch time). Benadryl 25 mg QHS.   Patient educated on signs of more serious drug reaction including: Eye pain/grittiness, oral lesions, skin sloughing, dysuria/genital lesions, pain on defecation/anal lesions, facial swelling, lymphadenopathy, fevers. If she is discharged, she may follow-up with Dr. Deleon in [facility] on Thursday.     Dermatology consult team will officially round on the patient tomorrow.

## 2018-06-21 ENCOUNTER — APPOINTMENT (OUTPATIENT)
Dept: DERMATOLOGY | Facility: CLINIC | Age: 18
End: 2018-06-21

## 2018-07-02 ENCOUNTER — APPOINTMENT (OUTPATIENT)
Dept: TRAUMA SURGERY | Facility: CLINIC | Age: 18
End: 2018-07-02
Payer: MEDICAID

## 2018-07-02 VITALS
HEART RATE: 71 BPM | TEMPERATURE: 98 F | BODY MASS INDEX: 27.66 KG/M2 | HEIGHT: 64 IN | WEIGHT: 162 LBS | SYSTOLIC BLOOD PRESSURE: 95 MMHG | DIASTOLIC BLOOD PRESSURE: 59 MMHG

## 2018-07-02 PROCEDURE — 99024 POSTOP FOLLOW-UP VISIT: CPT

## 2018-07-13 ENCOUNTER — APPOINTMENT (OUTPATIENT)
Dept: DERMATOLOGY | Facility: CLINIC | Age: 18
End: 2018-07-13
Payer: MEDICAID

## 2018-07-13 VITALS — WEIGHT: 160 LBS | HEIGHT: 64 IN | BODY MASS INDEX: 27.31 KG/M2

## 2018-07-13 DIAGNOSIS — Z91.89 OTHER SPECIFIED PERSONAL RISK FACTORS, NOT ELSEWHERE CLASSIFIED: ICD-10-CM

## 2018-07-13 DIAGNOSIS — L30.9 DERMATITIS, UNSPECIFIED: ICD-10-CM

## 2018-07-13 DIAGNOSIS — Z78.9 OTHER SPECIFIED HEALTH STATUS: ICD-10-CM

## 2018-07-13 PROCEDURE — 99213 OFFICE O/P EST LOW 20 MIN: CPT | Mod: GC

## 2018-07-30 ENCOUNTER — APPOINTMENT (OUTPATIENT)
Dept: TRAUMA SURGERY | Facility: CLINIC | Age: 18
End: 2018-07-30
Payer: MEDICAID

## 2018-07-30 VITALS
TEMPERATURE: 98.2 F | DIASTOLIC BLOOD PRESSURE: 63 MMHG | BODY MASS INDEX: 26.98 KG/M2 | HEIGHT: 64 IN | HEART RATE: 67 BPM | SYSTOLIC BLOOD PRESSURE: 98 MMHG | WEIGHT: 158 LBS

## 2018-07-30 DIAGNOSIS — R11.0 NAUSEA: ICD-10-CM

## 2018-07-30 PROCEDURE — 99212 OFFICE O/P EST SF 10 MIN: CPT | Mod: 24

## 2018-07-31 ENCOUNTER — TRANSCRIPTION ENCOUNTER (OUTPATIENT)
Age: 18
End: 2018-07-31

## 2018-08-01 ENCOUNTER — EMERGENCY (EMERGENCY)
Facility: HOSPITAL | Age: 18
LOS: 1 days | Discharge: HOME | End: 2018-08-01
Attending: EMERGENCY MEDICINE | Admitting: EMERGENCY MEDICINE
Payer: MEDICAID

## 2018-08-01 VITALS
DIASTOLIC BLOOD PRESSURE: 67 MMHG | TEMPERATURE: 98 F | SYSTOLIC BLOOD PRESSURE: 105 MMHG | OXYGEN SATURATION: 100 % | HEART RATE: 89 BPM | RESPIRATION RATE: 16 BRPM

## 2018-08-01 VITALS
DIASTOLIC BLOOD PRESSURE: 60 MMHG | RESPIRATION RATE: 17 BRPM | OXYGEN SATURATION: 100 % | HEART RATE: 88 BPM | SYSTOLIC BLOOD PRESSURE: 103 MMHG

## 2018-08-01 DIAGNOSIS — Z90.49 ACQUIRED ABSENCE OF OTHER SPECIFIED PARTS OF DIGESTIVE TRACT: Chronic | ICD-10-CM

## 2018-08-01 LAB
ALBUMIN SERPL ELPH-MCNC: 4.2 G/DL — SIGNIFICANT CHANGE UP (ref 3.3–5)
ALP SERPL-CCNC: 68 U/L — SIGNIFICANT CHANGE UP (ref 40–120)
ALT FLD-CCNC: 17 U/L — SIGNIFICANT CHANGE UP (ref 4–33)
AST SERPL-CCNC: 16 U/L — SIGNIFICANT CHANGE UP (ref 4–32)
BASE EXCESS BLDV CALC-SCNC: 1.9 MMOL/L — SIGNIFICANT CHANGE UP
BASOPHILS # BLD AUTO: 0.03 K/UL — SIGNIFICANT CHANGE UP (ref 0–0.2)
BASOPHILS NFR BLD AUTO: 0.4 % — SIGNIFICANT CHANGE UP (ref 0–2)
BILIRUB SERPL-MCNC: 0.5 MG/DL — SIGNIFICANT CHANGE UP (ref 0.2–1.2)
BLOOD GAS VENOUS - CREATININE: 0.64 MG/DL — SIGNIFICANT CHANGE UP (ref 0.5–1.3)
BUN SERPL-MCNC: 14 MG/DL — SIGNIFICANT CHANGE UP (ref 7–23)
CALCIUM SERPL-MCNC: 9.3 MG/DL — SIGNIFICANT CHANGE UP (ref 8.4–10.5)
CHLORIDE BLDV-SCNC: 104 MMOL/L — SIGNIFICANT CHANGE UP (ref 96–108)
CHLORIDE SERPL-SCNC: 101 MMOL/L — SIGNIFICANT CHANGE UP (ref 98–107)
CO2 SERPL-SCNC: 25 MMOL/L — SIGNIFICANT CHANGE UP (ref 22–31)
CREAT SERPL-MCNC: 0.74 MG/DL — SIGNIFICANT CHANGE UP (ref 0.5–1.3)
EOSINOPHIL # BLD AUTO: 0.08 K/UL — SIGNIFICANT CHANGE UP (ref 0–0.5)
EOSINOPHIL NFR BLD AUTO: 1 % — SIGNIFICANT CHANGE UP (ref 0–6)
GAS PNL BLDV: 137 MMOL/L — SIGNIFICANT CHANGE UP (ref 136–146)
GLUCOSE BLDV-MCNC: 94 — SIGNIFICANT CHANGE UP (ref 70–99)
GLUCOSE SERPL-MCNC: 94 MG/DL — SIGNIFICANT CHANGE UP (ref 70–99)
HCO3 BLDV-SCNC: 25 MMOL/L — SIGNIFICANT CHANGE UP (ref 20–27)
HCT VFR BLD CALC: 38.2 % — SIGNIFICANT CHANGE UP (ref 34.5–45)
HCT VFR BLDV CALC: 38.8 % — SIGNIFICANT CHANGE UP (ref 34.5–45)
HGB BLD-MCNC: 12.5 G/DL — SIGNIFICANT CHANGE UP (ref 11.5–15.5)
HGB BLDV-MCNC: 12.6 G/DL — SIGNIFICANT CHANGE UP (ref 11.5–15.5)
IMM GRANULOCYTES # BLD AUTO: 0.02 # — SIGNIFICANT CHANGE UP
IMM GRANULOCYTES NFR BLD AUTO: 0.2 % — SIGNIFICANT CHANGE UP (ref 0–1.5)
LACTATE BLDV-MCNC: 0.9 MMOL/L — SIGNIFICANT CHANGE UP (ref 0.5–2)
LIDOCAIN IGE QN: 35.5 U/L — SIGNIFICANT CHANGE UP (ref 7–60)
LYMPHOCYTES # BLD AUTO: 1.86 K/UL — SIGNIFICANT CHANGE UP (ref 1–3.3)
LYMPHOCYTES # BLD AUTO: 22.6 % — SIGNIFICANT CHANGE UP (ref 13–44)
MCHC RBC-ENTMCNC: 28.7 PG — SIGNIFICANT CHANGE UP (ref 27–34)
MCHC RBC-ENTMCNC: 32.7 % — SIGNIFICANT CHANGE UP (ref 32–36)
MCV RBC AUTO: 87.8 FL — SIGNIFICANT CHANGE UP (ref 80–100)
MONOCYTES # BLD AUTO: 0.53 K/UL — SIGNIFICANT CHANGE UP (ref 0–0.9)
MONOCYTES NFR BLD AUTO: 6.4 % — SIGNIFICANT CHANGE UP (ref 2–14)
NEUTROPHILS # BLD AUTO: 5.72 K/UL — SIGNIFICANT CHANGE UP (ref 1.8–7.4)
NEUTROPHILS NFR BLD AUTO: 69.4 % — SIGNIFICANT CHANGE UP (ref 43–77)
NRBC # FLD: 0 — SIGNIFICANT CHANGE UP
PCO2 BLDV: 51 MMHG — SIGNIFICANT CHANGE UP (ref 41–51)
PH BLDV: 7.35 PH — SIGNIFICANT CHANGE UP (ref 7.32–7.43)
PLATELET # BLD AUTO: 204 K/UL — SIGNIFICANT CHANGE UP (ref 150–400)
PMV BLD: 10.8 FL — SIGNIFICANT CHANGE UP (ref 7–13)
PO2 BLDV: 30 MMHG — LOW (ref 35–40)
POTASSIUM BLDV-SCNC: 3.7 MMOL/L — SIGNIFICANT CHANGE UP (ref 3.4–4.5)
POTASSIUM SERPL-MCNC: 3.9 MMOL/L — SIGNIFICANT CHANGE UP (ref 3.5–5.3)
POTASSIUM SERPL-SCNC: 3.9 MMOL/L — SIGNIFICANT CHANGE UP (ref 3.5–5.3)
PROT SERPL-MCNC: 7 G/DL — SIGNIFICANT CHANGE UP (ref 6–8.3)
RBC # BLD: 4.35 M/UL — SIGNIFICANT CHANGE UP (ref 3.8–5.2)
RBC # FLD: 13.2 % — SIGNIFICANT CHANGE UP (ref 10.3–14.5)
SAO2 % BLDV: 50.9 % — LOW (ref 60–85)
SODIUM SERPL-SCNC: 137 MMOL/L — SIGNIFICANT CHANGE UP (ref 135–145)
WBC # BLD: 8.24 K/UL — SIGNIFICANT CHANGE UP (ref 3.8–10.5)
WBC # FLD AUTO: 8.24 K/UL — SIGNIFICANT CHANGE UP (ref 3.8–10.5)

## 2018-08-01 PROCEDURE — 99283 EMERGENCY DEPT VISIT LOW MDM: CPT | Mod: 25

## 2018-08-01 NOTE — ED PROVIDER NOTE - PROGRESS NOTE DETAILS
Pt w/ much improved pain. Pt wants to go home. Basic labs wnl. Pt stable for discharge. Klepfish: ucg neg. labs grossly wnl. Pt remains asymptomatic during entire ED duration. Given copy of results, comfortable for dc, outpt pmd/surg f/u.

## 2018-08-01 NOTE — ED ADULT NURSE NOTE - NSIMPLEMENTINTERV_GEN_ALL_ED
Implemented All Universal Safety Interventions:  [place] to call system. Call bell, personal items and telephone within reach. Instruct patient to call for assistance. Room bathroom lighting operational. Non-slip footwear when patient is off stretcher. Physically safe environment: no spills, clutter or unnecessary equipment. Stretcher in lowest position, wheels locked, appropriate side rails in place.

## 2018-08-01 NOTE — ED ADULT NURSE NOTE - OBJECTIVE STATEMENT
pt presents to the ed s/p gallbladder removal surgery this past june with rt lower quad pain. pt endorses nausea and 1 episode of diarrhea today. pt denies vomiting, cp, urinary symptoms, or any other symptoms. pt is a&ox4 and ambulatory at baseline, skin intact, respirations even and unlabored, abd soft and non-distended. will continue to monitor. family at bedside.

## 2018-08-01 NOTE — ED PROVIDER NOTE - CHPI ED SYMPTOMS NEG
no hematuria/no blood in stool/no burning urination/no abdominal distension/no chills/no dysuria/no diarrhea/no vomiting/no fever

## 2018-08-01 NOTE — ED PROVIDER NOTE - ATTENDING CONTRIBUTION TO CARE
17F no PMH, PSH recent lap stacy p/w R mid abd pain, x1w, intermittent, non-radiating. No other systemic symptoms. Currently asymptomatic. Vitals wnl, exam as above. Benign abd.   ddx: Possibly GERD/gastritis/PUD vs. less likely renal colic or retained stone. Clinically not abscess.  CBC, cmp, lipase, ucg. Reassess.

## 2018-08-01 NOTE — ED ADULT TRIAGE NOTE - CHIEF COMPLAINT QUOTE
c.o intermittent RLQ pain and nausea x1 month. pt had cholecystectomy on 6/17. denies urinary symptoms. took 1 percocet at 1030pm. denies pmh

## 2018-08-01 NOTE — ED PROVIDER NOTE - OBJECTIVE STATEMENT
17F PMH acute stacy s/p lap stacy (6/17/18) now presenting with abdominal pain x1 week. Pt took her percocet at 1030p which completely eliminated the pain. The pt did not have any RLQ tenderness on exam without rebound or guarding. The RLQ pain is intermittent lasts a couple of hours and occurs a few times a day.   Pt had some tenderness at site of incisions. Pt follows with Dr. Kd Marques.  Pt endorses increased urination and increased thirst without dysuria. No chest pain, SOB, vomiting, fevers, chills, runny nose/cough. 17F PMH acute stacy s/p lap stacy (6/17/18) now presenting with abdominal pain x1 week. Pt took her percocet at 1030p which completely eliminated the pain. The pt did not have any RLQ tenderness on exam without rebound or guarding. The RLQ pain is intermittent lasts a couple of hours and occurs a few times a day. Pt had some tenderness at site of incisions. Pt follows with Dr. Kd Marques.  Pt endorses increased urination and increased thirst without dysuria. No chest pain, SOB, vomiting, fevers, chills, runny nose/cough.  [place]: 17F no PMH, PSH recent lap stacy p/w R mid abd pain, x1w, intermittent, non-radiating. Took perocet PTA and now completely asymptomatic. Denies f/c, NVD, black/bloody stool, urinary complaints, weakness/numbness, back pain, rashes, recent travel, sick contacts, SOB/CP. Normal PO intake, normal BMs.

## 2018-08-03 LAB
ALBUMIN SERPL ELPH-MCNC: 4.6 G/DL
ALP BLD-CCNC: 72 U/L
ALT SERPL-CCNC: 16 U/L
ANION GAP SERPL CALC-SCNC: 11 MMOL/L
AST SERPL-CCNC: 15 U/L
BILIRUB SERPL-MCNC: 0.4 MG/DL
BUN SERPL-MCNC: 10 MG/DL
CALCIUM SERPL-MCNC: 10.4 MG/DL
CHLORIDE SERPL-SCNC: 103 MMOL/L
CO2 SERPL-SCNC: 26 MMOL/L
CREAT SERPL-MCNC: 0.71 MG/DL
GLUCOSE SERPL-MCNC: 94 MG/DL
POTASSIUM SERPL-SCNC: 4.5 MMOL/L
PROT SERPL-MCNC: 7.2 G/DL
SODIUM SERPL-SCNC: 140 MMOL/L

## 2018-08-10 ENCOUNTER — APPOINTMENT (OUTPATIENT)
Dept: ULTRASOUND IMAGING | Facility: IMAGING CENTER | Age: 18
End: 2018-08-10
Payer: MEDICAID

## 2018-08-10 ENCOUNTER — OUTPATIENT (OUTPATIENT)
Dept: OUTPATIENT SERVICES | Facility: HOSPITAL | Age: 18
LOS: 1 days | End: 2018-08-10
Payer: COMMERCIAL

## 2018-08-10 DIAGNOSIS — Z90.49 ACQUIRED ABSENCE OF OTHER SPECIFIED PARTS OF DIGESTIVE TRACT: Chronic | ICD-10-CM

## 2018-08-10 DIAGNOSIS — R11.0 NAUSEA: ICD-10-CM

## 2018-08-10 PROBLEM — K81.0 ACUTE CHOLECYSTITIS: Chronic | Status: ACTIVE | Noted: 2018-08-01

## 2018-08-10 PROCEDURE — 76700 US EXAM ABDOM COMPLETE: CPT

## 2018-08-10 PROCEDURE — 76700 US EXAM ABDOM COMPLETE: CPT | Mod: 26

## 2018-09-17 ENCOUNTER — APPOINTMENT (OUTPATIENT)
Dept: ALLERGY | Facility: CLINIC | Age: 18
End: 2018-09-17

## 2019-01-08 ENCOUNTER — LABORATORY RESULT (OUTPATIENT)
Age: 19
End: 2019-01-08

## 2019-01-08 ENCOUNTER — APPOINTMENT (OUTPATIENT)
Dept: ALLERGY | Facility: CLINIC | Age: 19
End: 2019-01-08
Payer: MEDICAID

## 2019-01-08 VITALS
BODY MASS INDEX: 26.98 KG/M2 | SYSTOLIC BLOOD PRESSURE: 100 MMHG | HEART RATE: 72 BPM | RESPIRATION RATE: 14 BRPM | HEIGHT: 64 IN | WEIGHT: 158 LBS | DIASTOLIC BLOOD PRESSURE: 70 MMHG

## 2019-01-08 DIAGNOSIS — L50.9 URTICARIA, UNSPECIFIED: ICD-10-CM

## 2019-01-08 PROCEDURE — 95018 ALL TSTG PERQ&IQ DRUGS/BIOL: CPT

## 2019-01-08 PROCEDURE — 95004 PERQ TESTS W/ALRGNC XTRCS: CPT

## 2019-01-08 PROCEDURE — 99204 OFFICE O/P NEW MOD 45 MIN: CPT | Mod: 25

## 2019-01-08 RX ORDER — CETIRIZINE HCL 10 MG
TABLET ORAL
Refills: 0 | Status: DISCONTINUED | COMMUNITY
End: 2019-01-08

## 2019-01-08 RX ORDER — LEVOCETIRIZINE DIHYDROCHLORIDE 5 MG/1
5 TABLET ORAL DAILY
Qty: 90 | Refills: 2 | Status: ACTIVE | COMMUNITY
Start: 2019-01-08 | End: 1900-01-01

## 2019-01-09 LAB
THYROGLOB AB SERPL-ACNC: <20 IU/ML
THYROPEROXIDASE AB SERPL IA-ACNC: 18.8 IU/ML

## 2019-01-11 LAB
BLUE MUSSEL IGE QN: 0.12 KUA/L
CHRONIC URTICARIA PANEL (CU INDEX): <3.2
CLAM IGE QN: 0.23 KUA/L
CRAB IGE QN: 2.13 KUA/L
DEPRECATED BLUE MUSSEL IGE RAST QL: NORMAL
DEPRECATED CLAM IGE RAST QL: NORMAL
DEPRECATED CRAB IGE RAST QL: ABNORMAL
DEPRECATED LOBSTER IGE RAST QL: ABNORMAL
DEPRECATED OYSTER IGE RAST QL: 0
DEPRECATED SCALLOP IGE RAST QL: <0.1 KUA/L
DEPRECATED SHRIMP IGE RAST QL: ABNORMAL
DEPRECATED SQUID IGE RAST QL: 0
LOBSTER IGE QN: 0.74 KUA/L
OYSTER IGE QN: <0.1 KUA/L
SCALLOP IGE QN: 0
SCALLOP IGE QN: 1.76 KUA/L
SQUID IGE QN: <0.1 KUA/L

## 2019-01-14 ENCOUNTER — CLINICAL ADVICE (OUTPATIENT)
Age: 19
End: 2019-01-14

## 2019-01-14 RX ORDER — EPINEPHRINE 0.3 MG/.3ML
0.3 INJECTION INTRAMUSCULAR
Qty: 2 | Refills: 0 | Status: ACTIVE | COMMUNITY
Start: 2019-01-14 | End: 1900-01-01

## 2019-01-17 NOTE — PHYSICAL EXAM
[Alert] : alert [Well Nourished] : well nourished [Healthy Appearance] : healthy appearance [No Acute Distress] : no acute distress [Well Developed] : well developed [Normal Pupil & Iris Size/Symmetry] : normal pupil and iris size and symmetry [Sclera Not Icteric] : sclera not icteric [Normal Nasal Mucosa] : the nasal mucosa was normal [Normal Lips/Tongue] : the lips and tongue were normal [Normal Tonsils] : normal tonsils [No Thrush] : no thrush [Normal Dentition] : normal dentition [No Oral Lesions or Ulcers] : no oral lesions or ulcers [No Neck Mass] : no neck mass was observed [Supple] : the neck was supple [Normal Rate and Effort] : normal respiratory rhythm and effort [No Crackles] : no crackles [No Retractions] : no retractions [Bilateral Audible Breath Sounds] : bilateral audible breath sounds [Normal Rate] : heart rate was normal  [Normal S1, S2] : normal S1 and S2 [No murmur] : no murmur [Regular Rhythm] : with a regular rhythm [Soft] : abdomen soft [Not Tender] : non-tender [Not Distended] : not distended [No HSM] : no hepato-splenomegaly [Normal Cervical Lymph Nodes] : cervical [Normal Axillary Lumph Nodes] : axillary [Skin Intact] : skin intact  [No Skin Lesions] : no skin lesions [No Joint Swelling or Erythema] : no joint swelling or erythema [No clubbing] : no clubbing [No Edema] : no edema [No Cyanosis] : no cyanosis [Normal Mood] : mood was normal [Normal Affect] : affect was normal [Alert, Awake, Oriented as Age-Appropriate] : alert, awake, oriented as age appropriate [de-identified] : acneiform eruption on back

## 2019-01-17 NOTE — ASSESSMENT
[FreeTextEntry1] : History of allergic reaction to Cefotetan \par \par Chronic idiopathic urticaria/angioedema by history:\par \par Patient to take photo of next exacerbation of her symptoms \par Xyzal 5 mg BID for next exacerbation of her symptoms\par CAP RAST to shellfish - although skin testing negative\par See lab work up

## 2019-01-17 NOTE — HISTORY OF PRESENT ILLNESS
[Asthma] : asthma [Eczematous rashes] : eczematous rashes [Venom Reactions] : venom reactions [de-identified] : June 16th admitted to [facility] for abdominal pain - required laparoscopic gall bladder surgery performed on June 18th - 4 days after the surgery she had a pruritic rash on abdomen - rash spread to arms - treated with topical cream - the rash resolved over the course of one week   \par \par According to dermatology note patient had been treated with Zofran, Lovenox, oxycodone and Cefotetan.   The rash was morbilliform in appearance and felt to be secondary to the antibiotic. \par \par Patient reports a six year history of itchy patches on her body sometimes associated with lip swelling.   She will take Benadryl prn symptoms with resolution of her symptoms.  Her symptoms will occur 1x per month.    She does not take any NSAID medications.    Her last PE was about one month ago. \par \par Patient born in [place] and moves to [place] as infant. \par \par Shrimp may result in swollen lips.   She does not eat any seafood.

## 2019-01-17 NOTE — CONSULT LETTER
[Dear  ___] : Dear  [unfilled], [Thank you for referring [unfilled] for consultation for _____] : Thank you for referring [unfilled] for consultation for [unfilled] [Please see my note below.] : Please see my note below. [Sincerely,] : Sincerely, [FreeTextEntry3] : Mitchell B. Boxer, M.D., FAAAAI\par [facility]\par \par Department of Allergy-Immunology\par [facility] \par [address] [place] [address]\par [place] [address]\par Tel:   [phone]\par Fax:  [phone]\par Email: [email or web address]\par

## 2019-03-13 ENCOUNTER — TRANSCRIPTION ENCOUNTER (OUTPATIENT)
Age: 19
End: 2019-03-13

## 2019-06-10 NOTE — ED PROVIDER NOTE - SKIN, MLM
Detail Level: Zone
Initiate Treatment: Cordran tape Apply to affected areas on legs overnight for 2 weeks \\nFluocinonide cream Apply to affected areas on hands and legs twice daily for 7-10 days
Initiate Treatment: Fluocinonide cream Apply to affected areas on hands bid x7-10 days
Skin normal color for race, warm, dry and intact. No evidence of rash.

## 2020-07-16 NOTE — ED PROVIDER NOTE - OBJECTIVE STATEMENT
16-Jul-2020 17 yo female on OCP c/o chest pain, SOB, and subjective fevers that began yesterday. No recent travel or LE edema or pain. No abdominal pain, nausea or vomiting. No cough or recent illness. + occasional palpitations.

## 2020-09-22 NOTE — ED ADULT NURSE NOTE - HARM RISK FACTORS
Chief complaint:   Chief Complaint   Patient presents with   • Abdominal Pain     started yesterday but got really intense at 3:30 this morning, kept her awake, lower abdomen pain that shoots to both upper quadrants, comes and goes in waves, making weird noises, fatigue, denies nausea, hasn't been having regular bowel movements, not as frequent and harder than normal, last BM was a small amount once, bladder habits normal, worse while laying down   • Office Visit     ROOM 109 patient is here alone, work note needed       Vitals:  Visit Vitals  /88 (BP Location: LUE - Left upper extremity, Patient Position: Sitting, Cuff Size: Regular)   Pulse 81   Temp 98.3 °F (36.8 °C) (Oral)   Resp 18   Ht 6' (1.829 m)   SpO2 99%   BMI 23.73 kg/m²       HISTORY OF PRESENT ILLNESS     Ana Noyola is a 34 year old female that presents with sharp pelvic pain that started mild yesterday. Woke her up around 330 this morning and pain doubled her over. It seems to come in waves. She does have an IUD and doesn't get a regular period.  She has had ovarian cysts before. She did have chlamydia in July and was treated. Some constipation history recently. No urinary complaints.       Other significant problems:  Patient Active Problem List    Diagnosis Date Noted   • Acute maxillary sinusitis 02/15/2016     Priority: Low   • Hyperhidrosis      Priority: Low       PAST MEDICAL, FAMILY AND SOCIAL HISTORY     Medications:  Current Outpatient Medications   Medication   • aluminum chloride (HYPERCARE) 20 % topical solution   • topiramate (TOPAMAX) 50 MG tablet   • acyclovir (ZOVIRAX) 400 MG tablet   • busPIRone (BUSPAR) 15 MG tablet   • hydrOXYzine (ATARAX) 50 MG tablet   • traZODone (DESYREL) 50 MG tablet   • clobetasol (TEMOVATE) 0.05 % cream   • Levonorgestrel (MIRENA IU)     No current facility-administered medications for this visit.        Allergies:  ALLERGIES:  No Known Allergies    Past Medical  History/Surgeries:  Past  Medical History:   Diagnosis Date   • Abnormal Pap smear     colposcopy   • Herpes simplex without mention of complication    • Hyperhidrosis    • Ovarian cyst        Past Surgical History:   Procedure Laterality Date   • Appendectomy  age 13   • Colposcopy  2008   • Tonsillectomy and adenoidectomy  age 15       Family History:  Family History   Problem Relation Age of Onset   • High blood pressure Mother    • High blood pressure Father    • High cholesterol Father    • Glaucoma Sister    • Cancer Maternal Grandfather         Throat cancer   • Cancer Paternal Grandmother         Cervical cancer   • Heart disease Paternal Grandfather         Heart attack   • Other Maternal Grandmother         Alzheimer's dementia       Social History:  Social History     Tobacco Use   • Smoking status: Never Smoker   • Smokeless tobacco: Never Used   Substance Use Topics   • Alcohol use: Yes     Alcohol/week: 1.0 - 2.0 standard drinks     Types: 1 - 2 Standard drinks or equivalent per week     Comment: Last had alcohol 2 weeks ago       REVIEW OF SYSTEMS     Review of Systems   All other systems reviewed and are negative.      PHYSICAL EXAM     Physical Exam  Vitals signs and nursing note reviewed.   Constitutional:       General: She is not in acute distress.     Appearance: Normal appearance. She is normal weight. She is not ill-appearing, toxic-appearing or diaphoretic.   Cardiovascular:      Rate and Rhythm: Normal rate and regular rhythm.      Pulses: Normal pulses.      Heart sounds: Normal heart sounds.   Pulmonary:      Effort: Pulmonary effort is normal.      Breath sounds: Normal breath sounds.   Abdominal:      General: Abdomen is flat. Bowel sounds are normal. There is no distension.      Palpations: Abdomen is soft.      Tenderness: There is abdominal tenderness (bilateral lower quadrants). There is no right CVA tenderness, left CVA tenderness, guarding or rebound.   Genitourinary:     Labia:         Right: No rash,  tenderness or lesion.         Left: No rash, tenderness or lesion.       Cervix: Friability and cervical bleeding present. No cervical motion tenderness.      Adnexa:         Right: No mass, tenderness or fullness.          Left: No mass, tenderness or fullness.        Comments: IUD string present    Skin:     General: Skin is warm and dry.   Neurological:      General: No focal deficit present.      Mental Status: She is alert and oriented to person, place, and time.         ASSESSMENT/PLAN     Pelvic pain in female  (primary encounter diagnosis)  Comment:   Plan: CHLAMYDIA/GONORRHEA BY NUCLEIC ACID         AMPLIFICATION, US PELVIS COMPLETE NON OB,         ketorolac (TORADOL) injection 30 mg, CBC WITH         DIFFERENTIAL, COMPREHENSIVE METABOLIC PANEL,         CBC WITH AUTOMATED DIFFERENTIAL (PERFORMABLE         ONLY), POCT URINE DIP AUTO, traMADol (ULTRAM)         50 MG tablet            Vaginal discharge  Comment:   Plan: WET MOUNT, VAGINAL PATHOGENS,         CHLAMYDIA/GONORRHEA BY NUCLEIC ACID         AMPLIFICATION        .    Walk In on 09/22/2020   Component Date Value Ref Range Status   • CLUE CELLS, WET MOUNT 09/22/2020 None Seen  None Seen Final   • TRICHOMONAS, WET MOUNT 09/22/2020 None Seen  None Seen Final   • YEAST, WET MOUNT 09/22/2020 None Seen  None Seen Final   • Sodium 09/22/2020 141  135 - 145 mmol/L Final   • Potassium 09/22/2020 3.9  3.4 - 5.1 mmol/L Final   • Chloride 09/22/2020 100  98 - 107 mmol/L Final   • Carbon Dioxide 09/22/2020 24  21 - 32 mmol/L Final   • Anion Gap 09/22/2020 21* 10 - 20 mmol/L Final   • Glucose 09/22/2020 84  65 - 99 mg/dL Final   • BUN 09/22/2020 11  6 - 20 mg/dL Final   • Creatinine 09/22/2020 0.55  0.51 - 0.95 mg/dL Final   • Glomerular Filtration Rate 09/22/2020 >90  >90 mL/min/1.73m2 Final    eGFR results = or >90 mL/min/1.73m2 = Normal kidney function.   • BUN/ Creatinine Ratio 09/22/2020 20  7 - 25 Final   • Bilirubin, Total 09/22/2020 1.0  0.2 - 1.0 mg/dL  Final   • GOT/AST 09/22/2020 159* <=37 Units/L Final   • Alkaline Phosphatase 09/22/2020 55  45 - 117 Units/L Final   • Albumin 09/22/2020 4.2  3.6 - 5.1 g/dL Final   • Protein, Total 09/22/2020 7.4  6.4 - 8.2 g/dL Final   • Globulin 09/22/2020 3.2  2.0 - 4.0 g/dL Final   • A/G Ratio 09/22/2020 1.3  1.0 - 2.4 Final   • GPT/ALT 09/22/2020 49  <64 Units/L Final   • Calcium 09/22/2020 10.0  8.4 - 10.2 mg/dL Final   • WBC 09/22/2020 7.3  4.2 - 11.0 K/mcL Final   • RBC 09/22/2020 4.49  4.00 - 5.20 mil/mcL Final   • HGB 09/22/2020 15.0  12.0 - 15.5 g/dL Final   • HCT 09/22/2020 41.9  36.0 - 46.5 % Final   • MCV 09/22/2020 93.3  78.0 - 100.0 fl Final   • MCH 09/22/2020 33.4  26.0 - 34.0 pg Final   • MCHC 09/22/2020 35.8  32.0 - 36.5 g/dL Final   • RDW-CV 09/22/2020 11.5  11.0 - 15.0 % Final   • PLT 09/22/2020 147  140 - 450 K/mcL Final   • Neutrophil, Percent 09/22/2020 89  % Final   • Lymphocytes, Percent 09/22/2020 7  % Final   • Mono, Percent 09/22/2020 4  % Final   • Eosinophils, Percent 09/22/2020 0  % Final   • Basophils, Percent 09/22/2020 0  % Final   • Absolute Neutrophils 09/22/2020 6.4  1.8 - 7.7 K/mcL Final   • Absolute Lymphocytes 09/22/2020 0.5* 1.0 - 4.8 K/mcL Final   • Absolute Monocytes 09/22/2020 0.3  0.3 - 0.9 K/mcL Final   • Absolute Eosinophils  09/22/2020 0.0* 0.1 - 0.5 K/mcL Final   • Absolute Basophils 09/22/2020 0.0  0.0 - 0.3 K/mcL Final   • RDW-SD 09/22/2020 38.7* 39.0 - 50.0 fL Final   • POCT Color 09/22/2020 Brown   Final   • POCT Appearance 09/22/2020 Turbid   Final   • POCT Glucose Urine 09/22/2020 Negative  Negative Final   • POCT Bilirubin 09/22/2020 Large  Negative Final   • POCT Ketones 09/22/2020 40 mg/dL  Negative Final   • POCT Specific Gravity 09/22/2020 >= 1.030  1.005 - 1.03 Final   • POCT Occult Blood 09/22/2020 Negative  Negative Final   • POCT pH 09/22/2020 5.0  5 - 9 Final   • POCT Protein 09/22/2020 100 mg/dL  Negative Final   • POCT Urobilinogen 09/22/2020 1.0  0 - 1 mg/dL  Final   • Urine Nitrite 09/22/2020 Negative  Negative Final   • WBC (Leukocyte) Esterase POC 09/22/2020 Negative  Negative Final     CATH Us Pelvis Complete Non Ob    Result Date: 9/22/2020  US PELVIS COMP NON-OB TRANSABDOMINAL AND TRANSVAG INDICATION: pain COMPARISON:  Pelvic ultrasound performed on 4/28/2014. FINDINGS:  Real-time also examination of the pelvis was performed. Transabdominal transvaginal ultrasound was utilized. Grayscale imaging and color flow imaging was performed. Findings are as follows Uterus measures 8.0 x 4.1 x 3.0 cm. Endometrial stripe measures 1.7 mm in AP dimension. IUD appears be well-seated in the endometrium. Myometrium appears unremarkable. Right ovary measures 4.6 x 2.4 x 1.8 cm. Color flow imaging shows flow to the right ovary. A 2.3 x 2.2 x 1.7 cm right ovarian cyst is demonstrated. Left ovary measures 4.0 x 3.0 x 2.5 cm. Color flow imaging shows flow to the left ovary. There is a 2.5 x 2.2 x 2.2 cm cyst in the left ovary. There is also an adjacent 1.5 x 2.0 x 1.0 cm unilocular cyst in the left adnexal region     IMPRESSION:  IUD in good position 2. Color flow imaging shows flow to both ovaries 3. Bilateral adnexal cyst       I would recommend avoiding alcohol and any liver toxic medications at this time  Await STI swabs.  Tramadol as needed for pain  Work excuse given for today  Follow up with PCP/OB/gyn    Risks, benefits, alternatives, expected outcomes, limitations, and possible complications of treatment/procedure were reviewed.   Verbalizes consent, agreement, and understanding.   Criteria to return to the urgent care or emergency medicine department discussed along with home care tips.   Patient education instructions printed and reviewed with patient.  All questions were answered prior to discharge.         no

## 2022-12-31 ENCOUNTER — EMERGENCY (EMERGENCY)
Facility: HOSPITAL | Age: 22
LOS: 1 days | Discharge: HOME | End: 2022-12-31
Attending: EMERGENCY MEDICINE | Admitting: EMERGENCY MEDICINE
Payer: MEDICAID

## 2022-12-31 VITALS
SYSTOLIC BLOOD PRESSURE: 111 MMHG | TEMPERATURE: 99 F | HEART RATE: 65 BPM | OXYGEN SATURATION: 100 % | RESPIRATION RATE: 18 BRPM | DIASTOLIC BLOOD PRESSURE: 65 MMHG

## 2022-12-31 VITALS
RESPIRATION RATE: 16 BRPM | HEART RATE: 120 BPM | DIASTOLIC BLOOD PRESSURE: 69 MMHG | OXYGEN SATURATION: 97 % | TEMPERATURE: 99 F | SYSTOLIC BLOOD PRESSURE: 122 MMHG

## 2022-12-31 DIAGNOSIS — Z90.49 ACQUIRED ABSENCE OF OTHER SPECIFIED PARTS OF DIGESTIVE TRACT: Chronic | ICD-10-CM

## 2022-12-31 LAB
ALBUMIN SERPL ELPH-MCNC: 4.4 G/DL — SIGNIFICANT CHANGE UP (ref 3.3–5)
ALP SERPL-CCNC: 90 U/L — SIGNIFICANT CHANGE UP (ref 40–120)
ALT FLD-CCNC: 30 U/L — SIGNIFICANT CHANGE UP (ref 4–33)
ANION GAP SERPL CALC-SCNC: 14 MMOL/L — SIGNIFICANT CHANGE UP (ref 7–14)
APPEARANCE UR: ABNORMAL
AST SERPL-CCNC: 21 U/L — SIGNIFICANT CHANGE UP (ref 4–32)
BACTERIA # UR AUTO: ABNORMAL
BASOPHILS # BLD AUTO: 0.02 K/UL — SIGNIFICANT CHANGE UP (ref 0–0.2)
BASOPHILS NFR BLD AUTO: 0.2 % — SIGNIFICANT CHANGE UP (ref 0–2)
BILIRUB SERPL-MCNC: 0.3 MG/DL — SIGNIFICANT CHANGE UP (ref 0.2–1.2)
BILIRUB UR-MCNC: NEGATIVE — SIGNIFICANT CHANGE UP
BUN SERPL-MCNC: 9 MG/DL — SIGNIFICANT CHANGE UP (ref 7–23)
CALCIUM SERPL-MCNC: 9.7 MG/DL — SIGNIFICANT CHANGE UP (ref 8.4–10.5)
CHLORIDE SERPL-SCNC: 100 MMOL/L — SIGNIFICANT CHANGE UP (ref 98–107)
CO2 SERPL-SCNC: 22 MMOL/L — SIGNIFICANT CHANGE UP (ref 22–31)
COLOR SPEC: YELLOW — SIGNIFICANT CHANGE UP
CREAT SERPL-MCNC: 0.59 MG/DL — SIGNIFICANT CHANGE UP (ref 0.5–1.3)
DIFF PNL FLD: NEGATIVE — SIGNIFICANT CHANGE UP
EGFR: 129 ML/MIN/1.73M2 — SIGNIFICANT CHANGE UP
EOSINOPHIL # BLD AUTO: 0.04 K/UL — SIGNIFICANT CHANGE UP (ref 0–0.5)
EOSINOPHIL NFR BLD AUTO: 0.4 % — SIGNIFICANT CHANGE UP (ref 0–6)
EPI CELLS # UR: 24 /HPF — HIGH (ref 0–5)
FLUAV AG NPH QL: SIGNIFICANT CHANGE UP
FLUBV AG NPH QL: SIGNIFICANT CHANGE UP
GLUCOSE SERPL-MCNC: 92 MG/DL — SIGNIFICANT CHANGE UP (ref 70–99)
GLUCOSE UR QL: NEGATIVE — SIGNIFICANT CHANGE UP
HCG SERPL-ACNC: 1845 MIU/ML — SIGNIFICANT CHANGE UP
HCT VFR BLD CALC: 39.3 % — SIGNIFICANT CHANGE UP (ref 34.5–45)
HGB BLD-MCNC: 12.7 G/DL — SIGNIFICANT CHANGE UP (ref 11.5–15.5)
HYALINE CASTS # UR AUTO: 1 /LPF — SIGNIFICANT CHANGE UP (ref 0–7)
IANC: 5.96 K/UL — SIGNIFICANT CHANGE UP (ref 1.8–7.4)
IMM GRANULOCYTES NFR BLD AUTO: 0.3 % — SIGNIFICANT CHANGE UP (ref 0–0.9)
KETONES UR-MCNC: NEGATIVE — SIGNIFICANT CHANGE UP
LEUKOCYTE ESTERASE UR-ACNC: ABNORMAL
LYMPHOCYTES # BLD AUTO: 2.83 K/UL — SIGNIFICANT CHANGE UP (ref 1–3.3)
LYMPHOCYTES # BLD AUTO: 30 % — SIGNIFICANT CHANGE UP (ref 13–44)
MCHC RBC-ENTMCNC: 28.2 PG — SIGNIFICANT CHANGE UP (ref 27–34)
MCHC RBC-ENTMCNC: 32.3 GM/DL — SIGNIFICANT CHANGE UP (ref 32–36)
MCV RBC AUTO: 87.3 FL — SIGNIFICANT CHANGE UP (ref 80–100)
MONOCYTES # BLD AUTO: 0.55 K/UL — SIGNIFICANT CHANGE UP (ref 0–0.9)
MONOCYTES NFR BLD AUTO: 5.8 % — SIGNIFICANT CHANGE UP (ref 2–14)
NEUTROPHILS # BLD AUTO: 5.96 K/UL — SIGNIFICANT CHANGE UP (ref 1.8–7.4)
NEUTROPHILS NFR BLD AUTO: 63.3 % — SIGNIFICANT CHANGE UP (ref 43–77)
NITRITE UR-MCNC: NEGATIVE — SIGNIFICANT CHANGE UP
NRBC # BLD: 0 /100 WBCS — SIGNIFICANT CHANGE UP (ref 0–0)
NRBC # FLD: 0 K/UL — SIGNIFICANT CHANGE UP (ref 0–0)
PH UR: 6.5 — SIGNIFICANT CHANGE UP (ref 5–8)
PLATELET # BLD AUTO: 244 K/UL — SIGNIFICANT CHANGE UP (ref 150–400)
POTASSIUM SERPL-MCNC: 3.9 MMOL/L — SIGNIFICANT CHANGE UP (ref 3.5–5.3)
POTASSIUM SERPL-SCNC: 3.9 MMOL/L — SIGNIFICANT CHANGE UP (ref 3.5–5.3)
PROT SERPL-MCNC: 7.6 G/DL — SIGNIFICANT CHANGE UP (ref 6–8.3)
PROT UR-MCNC: ABNORMAL
RBC # BLD: 4.5 M/UL — SIGNIFICANT CHANGE UP (ref 3.8–5.2)
RBC # FLD: 13.2 % — SIGNIFICANT CHANGE UP (ref 10.3–14.5)
RBC CASTS # UR COMP ASSIST: 4 /HPF — SIGNIFICANT CHANGE UP (ref 0–4)
RSV RNA NPH QL NAA+NON-PROBE: SIGNIFICANT CHANGE UP
SARS-COV-2 RNA SPEC QL NAA+PROBE: SIGNIFICANT CHANGE UP
SODIUM SERPL-SCNC: 136 MMOL/L — SIGNIFICANT CHANGE UP (ref 135–145)
SP GR SPEC: 1.03 — SIGNIFICANT CHANGE UP (ref 1.01–1.05)
UROBILINOGEN FLD QL: SIGNIFICANT CHANGE UP
WBC # BLD: 9.43 K/UL — SIGNIFICANT CHANGE UP (ref 3.8–10.5)
WBC # FLD AUTO: 9.43 K/UL — SIGNIFICANT CHANGE UP (ref 3.8–10.5)
WBC UR QL: SIGNIFICANT CHANGE UP /HPF (ref 0–5)

## 2022-12-31 PROCEDURE — 76830 TRANSVAGINAL US NON-OB: CPT | Mod: 26

## 2022-12-31 PROCEDURE — 99285 EMERGENCY DEPT VISIT HI MDM: CPT

## 2022-12-31 RX ORDER — DOXYLAMINE SUCCINATE AND PYRIDOXINE HYDROCHLORIDE, DELAYED RELEASE TABLETS 10 MG/10 MG 10; 10 MG/1; MG/1
2 TABLET, DELAYED RELEASE ORAL
Qty: 60 | Refills: 0
Start: 2022-12-31 | End: 2023-01-29

## 2022-12-31 RX ORDER — SODIUM CHLORIDE 9 MG/ML
2000 INJECTION INTRAMUSCULAR; INTRAVENOUS; SUBCUTANEOUS ONCE
Refills: 0 | Status: COMPLETED | OUTPATIENT
Start: 2022-12-31 | End: 2022-12-31

## 2022-12-31 RX ADMIN — SODIUM CHLORIDE 2000 MILLILITER(S): 9 INJECTION INTRAMUSCULAR; INTRAVENOUS; SUBCUTANEOUS at 14:06

## 2022-12-31 NOTE — ED PROVIDER NOTE - OBJECTIVE STATEMENT
23 yo F with no pmh, PSH cholecystectomy a/w nausea and possible pregnancy.  Pt reports having nausea in the AM for the past month, noted to have LMP 11/25/22.  Noted to have no nausea currently.  Pt reports having intermittent chills with no fever, no abdominal pain, no vomiting, no vaginal discharge, no sore throat or nasal congestion, no headache.      Allergies: Cephalosporins

## 2022-12-31 NOTE — ED PROVIDER NOTE - PATIENT PORTAL LINK FT
You can access the FollowMyHealth Patient Portal offered by [facility] by registering at the following website: [email or web address]. By joining [employer]’s FollowMyHealth portal, you will also be able to view your health information using other applications (apps) compatible with our system.

## 2022-12-31 NOTE — ED PROVIDER NOTE - NSFOLLOWUPINSTRUCTIONS_ED_ALL_ED_FT
You were found to have nausea in the setting of pregnancy.  Your ultrasound shows an early pregnancy.  Please follow up with your gyn.      Take prenatal vitamins daily.     Take diclegis 2 tabs every night for nausea.      Drink plenty of fluids.      Hyperemesis Gravidarum  Hyperemesis gravidarum is a severe form of nausea and vomiting that happens during pregnancy. Hyperemesis is worse than morning sickness. It may cause you to have nausea or vomiting all day for many days. It may keep you from eating and drinking enough food and liquids, which can lead to dehydration, malnutrition, and weight loss. Hyperemesis usually occurs during the first half (the first 20 weeks) of pregnancy. It often goes away once a woman is in her second half of pregnancy. However, sometimes hyperemesis continues through an entire pregnancy.    What are the causes?  - The cause of this condition is not known. It may be related to changes in chemicals (hormones) in the body during pregnancy, such as the high level of pregnancy hormone (human chorionic gonadotropin) or the increase in the female sex hormone (estrogen).  What are the signs or symptoms?  Symptoms of this condition include:  - Nausea that does not go away.  - Vomiting that does not allow you to keep any food down.  - Weight loss.  - Body fluid loss (dehydration).  - Having no desire to eat, or not liking food that you have previously enjoyed.    How is this diagnosed?  This condition may be diagnosed based on:  - A physical exam.  - Your medical history.  - Your symptoms.  - Blood tests.  - Urine tests.    How is this treated?  This condition is managed by controlling symptoms. This may include:  - Following an eating plan. This can help lessen nausea and vomiting.   - Taking prescription medicines.  - An eating plan and medicines are often used together to help control symptoms. If medicines do not help relieve nausea and vomiting, you may need to receive fluids through an IV at the hospital.    Follow these instructions at home:  - Eating and drinking      Avoid the following:  -Drinking fluids with meals. Try not to drink anything during the 30 minutes before and after your meals.  Drinking more than 1 cup of fluid at a time.  - Eating foods that trigger your symptoms. These may include spicy foods, coffee, high-fat foods, very sweet foods, and acidic foods.  - Skipping meals. Nausea can be more intense on an empty stomach. If you cannot tolerate food, do not force it. Try sucking on ice chips or other frozen items and make up for missed calories later.   - Lying down within 2 hours after eating.   - Being exposed to environmental triggers. These may include food smells, smoky rooms, closed spaces, rooms with strong smells, warm or humid places, overly loud and noisy rooms, and rooms with motion or flickering lights. Try eating meals in a well-ventilated area that is free of strong smells.  - Quick and sudden changes in your movement.  - Taking iron pills and multivitamins that contain iron. If you take prescription iron pills, do not stop taking them unless your health care provider approves.  - Preparing food. The smell of food can spoil your appetite or trigger nausea.    To help relieve your symptoms:  - Listen to your body. Everyone is different and has different preferences. Find what works best for you.Eat and drink slowly.  - Eat 5–6 small meals daily instead of 3 large meals. Eating small meals and snacks can help you avoid an empty stomach.  - In the morning, before getting out of bed, eat a couple of crackers to avoid moving around on an empty stomach.  - Try eating starchy foods as these are usually tolerated well. Examples include cereal, toast, bread, potatoes, pasta, rice, and pretzels.  - Include at least 1 serving of protein with your meals and snacks. Protein options include lean meats, poultry, seafood, beans, nuts, nut butters, eggs, cheese, and yogurt.  - Try eating a protein-rich snack before bed. Examples of a protein-kathy snack include cheese and crackers or a peanut butter sandwich made with 1 slice of whole-wheat bread and 1 tsp (5 g) of peanut butter.  - Eat or suck on things that have ginger in them. It may help relieve nausea. Add ¼ tsp ground ginger to hot tea or choose ginger tea.  - Try drinking 100% fruit juice or an electrolyte drink. An electrolyte drink contains sodium, potassium, and chloride.  - Drink fluids that are cold, clear, and carbonated or sour. Examples include lemonade, ginger ale, lemon–lime soda, ice water, and sparkling water.  - Brush your teeth or use a mouth rinse after meals.  - Talk with your health care provider about starting a supplement of vitamin B6.General instructions     Take over-the-counter and prescription medicines only as told by your health care provider.  - Follow instructions from your health care provider about eating or drinking restrictions.  - Continue to take your prenatal vitamins as told by your health care provider. If you are having trouble taking your prenatal vitamins, talk with your health care provider about different options.  - Keep all follow-up and pre-birth (prenatal) visits as told by your health care provider. This is important.    Contact a health care provider if:  - You have pain in your abdomen.  - You have a severe headache.  - You have vision problems.  - You are losing weight.  - You feel weak or dizzy.    Get help right away if:  - You cannot drink fluids without vomiting.  - You vomit blood.  - You have constant nausea and vomiting.  - You are very weak.  - You faint.  - You have a fever and your symptoms suddenly get worse.    Summary  Hyperemesis gravidarum is a severe form of nausea and vomiting that happens during pregnancy.  - Making some changes to your eating habits may help relieve nausea and vomiting.  - This condition may be managed with medicine.  - If medicines do not help relieve nausea and vomiting, you may need to receive fluids through an IV at the hospital.  - This information is not intended to replace advice given to you by your health care provider. Make sure you discuss any questions you have with your health care provider.

## 2022-12-31 NOTE — ED ADULT NURSE NOTE - OBJECTIVE STATEMENT
received pt in intake room 12, 22 yr/o female A+OX4, ambulatory at baseline. presented to the ED C/O nausea and abdominal pain for 1 week. states LMP was 11/25. pts UCG positive. left AC 20g placed, labs drawn and sent. fluids initiated

## 2022-12-31 NOTE — ED PROVIDER NOTE - CLINICAL SUMMARY MEDICAL DECISION MAKING FREE TEXT BOX
23 yo  LMP  a/w nausea and chills x 1 month with no vomiting, occurs only in AM with no diarrhea, or abdominal pain.  Will eval for early pregnancy, if positive will order tvus to check for iup. Given tachycardia with treat with IVF, patient refusing zofran as not currently nausea.  Will reassess after IVF for dehydration.  Check COVID flu, RSV for chills and nausea. Reassess.

## 2022-12-31 NOTE — ED PROVIDER NOTE - PHYSICAL EXAMINATION
GEN - NAD; well appearing; A+O x3; non-toxic appearing  CARD -s1s2, tachycardic, no M,G,R;   PULM - CTA b/l, symmetric breath sounds;   ABD -  +BS, ND, NT, soft, no guarding, no rebound, no masses;   BACK - no CVA tenderness, Normal  spine;   EXT - symmetric pulses, 2+ dp, capillary refill < 2 seconds, no cyanosis, no edema;   NEURO - no focal neuro deficits, no slurred speech

## 2022-12-31 NOTE — ED ADULT TRIAGE NOTE - CHIEF COMPLAINT QUOTE
Pt. c/o nausea and chills x 1 month. States nausea is only in the morning. Denies vomiting, diarrhea, abdominal pain.

## 2022-12-31 NOTE — ED PROVIDER NOTE - NS ED ROS FT
REVIEW OF SYSTEMS:     CONSTITUTIONAL: No fever, weight loss, or fatigue  EYES: No eye pain, visual disturbances, or discharge  ENMT:  No difficulty hearing, tinnitus, vertigo; No sinus or throat pain  NECK: No pain or stiffness  RESPIRATORY: No cough, wheezing, chills or hemoptysis; No shortness of breath  CARDIOVASCULAR: No chest pain, palpitations, dizziness, or leg swelling  GASTROINTESTINAL: + nausea No abdominal or epigastric pain. No vomiting, or hematemesis; No diarrhea or constipation. No melena or hematochezia.  GENITOURINARY: + urinary frequency No dysuria, hematuria, or incontinence  NEUROLOGICAL: No headaches, memory loss, loss of strength, numbness, or tremors  SKIN: No itching, burning, rashes, or lesions

## 2023-01-02 LAB
CULTURE RESULTS: SIGNIFICANT CHANGE UP
SPECIMEN SOURCE: SIGNIFICANT CHANGE UP

## 2023-02-16 ENCOUNTER — EMERGENCY (EMERGENCY)
Facility: HOSPITAL | Age: 23
LOS: 1 days | Discharge: HOME | End: 2023-02-16
Attending: EMERGENCY MEDICINE | Admitting: EMERGENCY MEDICINE
Payer: MEDICAID

## 2023-02-16 VITALS
TEMPERATURE: 98 F | RESPIRATION RATE: 16 BRPM | HEART RATE: 104 BPM | SYSTOLIC BLOOD PRESSURE: 139 MMHG | OXYGEN SATURATION: 100 % | DIASTOLIC BLOOD PRESSURE: 77 MMHG

## 2023-02-16 DIAGNOSIS — Z90.49 ACQUIRED ABSENCE OF OTHER SPECIFIED PARTS OF DIGESTIVE TRACT: Chronic | ICD-10-CM

## 2023-02-16 LAB
ALBUMIN SERPL ELPH-MCNC: 4.1 G/DL — SIGNIFICANT CHANGE UP (ref 3.3–5)
ALP SERPL-CCNC: 71 U/L — SIGNIFICANT CHANGE UP (ref 40–120)
ALT FLD-CCNC: 27 U/L — SIGNIFICANT CHANGE UP (ref 4–33)
ANION GAP SERPL CALC-SCNC: 10 MMOL/L — SIGNIFICANT CHANGE UP (ref 7–14)
AST SERPL-CCNC: 20 U/L — SIGNIFICANT CHANGE UP (ref 4–32)
BASOPHILS # BLD AUTO: 0.03 K/UL — SIGNIFICANT CHANGE UP (ref 0–0.2)
BASOPHILS NFR BLD AUTO: 0.4 % — SIGNIFICANT CHANGE UP (ref 0–2)
BILIRUB SERPL-MCNC: <0.2 MG/DL — SIGNIFICANT CHANGE UP (ref 0.2–1.2)
BLD GP AB SCN SERPL QL: NEGATIVE — SIGNIFICANT CHANGE UP
BUN SERPL-MCNC: 11 MG/DL — SIGNIFICANT CHANGE UP (ref 7–23)
CALCIUM SERPL-MCNC: 9.5 MG/DL — SIGNIFICANT CHANGE UP (ref 8.4–10.5)
CHLORIDE SERPL-SCNC: 101 MMOL/L — SIGNIFICANT CHANGE UP (ref 98–107)
CO2 SERPL-SCNC: 24 MMOL/L — SIGNIFICANT CHANGE UP (ref 22–31)
CREAT SERPL-MCNC: 0.77 MG/DL — SIGNIFICANT CHANGE UP (ref 0.5–1.3)
EGFR: 110 ML/MIN/1.73M2 — SIGNIFICANT CHANGE UP
EOSINOPHIL # BLD AUTO: 0.07 K/UL — SIGNIFICANT CHANGE UP (ref 0–0.5)
EOSINOPHIL NFR BLD AUTO: 0.9 % — SIGNIFICANT CHANGE UP (ref 0–6)
GLUCOSE SERPL-MCNC: 100 MG/DL — HIGH (ref 70–99)
HCG SERPL-ACNC: 7342 MIU/ML — SIGNIFICANT CHANGE UP
HCT VFR BLD CALC: 37.8 % — SIGNIFICANT CHANGE UP (ref 34.5–45)
HGB BLD-MCNC: 12.2 G/DL — SIGNIFICANT CHANGE UP (ref 11.5–15.5)
IANC: 4.69 K/UL — SIGNIFICANT CHANGE UP (ref 1.8–7.4)
IMM GRANULOCYTES NFR BLD AUTO: 0.1 % — SIGNIFICANT CHANGE UP (ref 0–0.9)
LYMPHOCYTES # BLD AUTO: 2.6 K/UL — SIGNIFICANT CHANGE UP (ref 1–3.3)
LYMPHOCYTES # BLD AUTO: 33 % — SIGNIFICANT CHANGE UP (ref 13–44)
MCHC RBC-ENTMCNC: 27.8 PG — SIGNIFICANT CHANGE UP (ref 27–34)
MCHC RBC-ENTMCNC: 32.3 GM/DL — SIGNIFICANT CHANGE UP (ref 32–36)
MCV RBC AUTO: 86.1 FL — SIGNIFICANT CHANGE UP (ref 80–100)
MONOCYTES # BLD AUTO: 0.48 K/UL — SIGNIFICANT CHANGE UP (ref 0–0.9)
MONOCYTES NFR BLD AUTO: 6.1 % — SIGNIFICANT CHANGE UP (ref 2–14)
NEUTROPHILS # BLD AUTO: 4.69 K/UL — SIGNIFICANT CHANGE UP (ref 1.8–7.4)
NEUTROPHILS NFR BLD AUTO: 59.5 % — SIGNIFICANT CHANGE UP (ref 43–77)
NRBC # BLD: 0 /100 WBCS — SIGNIFICANT CHANGE UP (ref 0–0)
NRBC # FLD: 0 K/UL — SIGNIFICANT CHANGE UP (ref 0–0)
PLATELET # BLD AUTO: 238 K/UL — SIGNIFICANT CHANGE UP (ref 150–400)
POTASSIUM SERPL-MCNC: 3.8 MMOL/L — SIGNIFICANT CHANGE UP (ref 3.5–5.3)
POTASSIUM SERPL-SCNC: 3.8 MMOL/L — SIGNIFICANT CHANGE UP (ref 3.5–5.3)
PROT SERPL-MCNC: 7.1 G/DL — SIGNIFICANT CHANGE UP (ref 6–8.3)
RBC # BLD: 4.39 M/UL — SIGNIFICANT CHANGE UP (ref 3.8–5.2)
RBC # FLD: 13.3 % — SIGNIFICANT CHANGE UP (ref 10.3–14.5)
RH IG SCN BLD-IMP: POSITIVE — SIGNIFICANT CHANGE UP
SODIUM SERPL-SCNC: 135 MMOL/L — SIGNIFICANT CHANGE UP (ref 135–145)
WBC # BLD: 7.88 K/UL — SIGNIFICANT CHANGE UP (ref 3.8–10.5)
WBC # FLD AUTO: 7.88 K/UL — SIGNIFICANT CHANGE UP (ref 3.8–10.5)

## 2023-02-16 PROCEDURE — 99284 EMERGENCY DEPT VISIT MOD MDM: CPT

## 2023-02-16 PROCEDURE — 76817 TRANSVAGINAL US OBSTETRIC: CPT | Mod: 26

## 2023-02-16 NOTE — ED ADULT NURSE NOTE - CHIEF COMPLAINT QUOTE
Pt sent to ED by OBGYN for possible miscarriage. Pt had US today that showed no fetal heartbeat. Pt unsure of how far along she is in her pregnancy, approx. 8-10 weeks. Denies abdominal pain, vaginal bleeding.

## 2023-02-16 NOTE — ED PROVIDER NOTE - NSFOLLOWUPINSTRUCTIONS_ED_ALL_ED_FT
your ultrasound shows a fetus in the uterus but no heartbeat  and your hormone level is decreasing.  this is a fetal demise.  you need to follow up with your gyn appointment on wenesday for definitive care.  In the meantime if you develop a fever or severe bleeding return to the emergency department.  at some point you may well start passing blood and tissue, if the bleeding is not severe and the pain is controllable by motrin it is ok to stay home and follow with your gyn doctor.    I am sorry for your loss    it is common to feel sad after something like this.  if you find you get very depressed we are open 24/7 to provide care if you feel like hurting your self or others    if you notice ongoing depression you can go to the crisis center next door in [facility] mon-fri     You are being discharged from the Emergency Department after evaluation of your presenting problem.  You were found not to have an emergency that requires hospitalization or surgery, but this does not mean you do not have a health concern.  You should follow up with your primary care physician and any other physicians suggested at time of discharge.  Also, if your condition worsens or changes know that the emergency department is open and available 24 hours a day/ 7 days a week and you should return to us if you have concerns. Thank you for allowing us to participate in your care.

## 2023-02-16 NOTE — ED PROVIDER NOTE - PATIENT PORTAL LINK FT
You can access the FollowMyHealth Patient Portal offered by [facility] by registering at the following website: [email or web address]. By joining [employer]’s FollowMyHealth portal, you will also be able to view your health information using other applications (apps) compatible with our system.

## 2023-02-16 NOTE — ED PROVIDER NOTE - OBJECTIVE STATEMENT
Patient is a 22-year-old woman who comes in with an ultrasound that shows fetal demise report.  Patient unsure of last period but when she was here December 31 she had a gestational sac on her sono that showed an IUP consistent with about 5-1/2-week pregnancy.  By dates that would put her at about 11 weeks.  The ultrasound today showed about an 8-week pregnancy but no FH.  She has no vaginal bleeding or pain.  She denies any other past medical history allergies.  She has not rotator cuff surgery and cholecystitis surgery both done.  Patient is tearful.  She has an appointment this week with Dr. Figueroa on Wednesday.  She reports her blood work shows an Rh that is O positive but it is not recorded in our computer.  She is very tearful and her  states he does not accept that the fetus is not alive until another ultrasound is done.    Patient otherwise feels well denies nausea vomiting fever dysuria chest pain shortness of breath.  Denies vaginal bleeding denies abdominal pain.

## 2023-02-16 NOTE — ED ADULT NURSE NOTE - NSIMPLEMENTINTERV_GEN_ALL_ED
Implemented All Universal Safety Interventions:  Shoals to call system. Call bell, personal items and telephone within reach. Instruct patient to call for assistance. Room bathroom lighting operational. Non-slip footwear when patient is off stretcher. Physically safe environment: no spills, clutter or unnecessary equipment. Stretcher in lowest position, wheels locked, appropriate side rails in place.

## 2023-02-16 NOTE — ED ADULT NURSE NOTE - OBJECTIVE STATEMENT
Patient A&o X4, received in intake, with complaints of miscarriage. Patient emotional during assessment, unable to obtain conversation. As per triage note, patient was sent in by GYN for possible miscarriage. Patient had US done today which showed no fetal heartbeat. Patient denies any pain or cramping at this time. Patient able to speak in clear sentences, respirations equal and unlabored. Lung sounds clear b/l, equal chest rise and fall noted. Denies CP/SOB, fever, chills, nausea, vomiting and diarrhea at this time. Skin warm and dry. Provider at bedside for eval, pending further orders.

## 2023-02-16 NOTE — ED PROVIDER NOTE - PHYSICAL EXAMINATION
pt alert and can phonate well  h at/nc  perrl, conj clear, sclera anicteric,  neck supple  abd soft no r/g/t  gyn exam os closed, no cmt, no adnexal tenderness, no blood in the vault  ext no edema no deformities  neueo awake, lucid normal gait moves all extremities with strength  psych normal affect  vs reasonable

## 2023-02-16 NOTE — ED PROVIDER NOTE - CLINICAL SUMMARY MEDICAL DECISION MAKING FREE TEXT BOX
Nathaly Razo MD attending physician.  Patient comes in with report of an ultrasound that shows fetal demise.  Patient reports blood type is O+ but we do not have that recorded in our computer.  Pregnancy should about 11 weeks.  We will repeat ultrasound here in valve discussing with GYN and appropriate follow-up

## 2023-02-18 ENCOUNTER — EMERGENCY (EMERGENCY)
Facility: HOSPITAL | Age: 23
LOS: 1 days | Discharge: HOME | End: 2023-02-18
Attending: EMERGENCY MEDICINE | Admitting: EMERGENCY MEDICINE
Payer: MEDICAID

## 2023-02-18 VITALS
TEMPERATURE: 98 F | RESPIRATION RATE: 18 BRPM | HEART RATE: 88 BPM | DIASTOLIC BLOOD PRESSURE: 67 MMHG | SYSTOLIC BLOOD PRESSURE: 111 MMHG | OXYGEN SATURATION: 100 %

## 2023-02-18 VITALS
TEMPERATURE: 98 F | OXYGEN SATURATION: 100 % | HEART RATE: 100 BPM | SYSTOLIC BLOOD PRESSURE: 124 MMHG | DIASTOLIC BLOOD PRESSURE: 78 MMHG | RESPIRATION RATE: 18 BRPM

## 2023-02-18 DIAGNOSIS — Z90.49 ACQUIRED ABSENCE OF OTHER SPECIFIED PARTS OF DIGESTIVE TRACT: Chronic | ICD-10-CM

## 2023-02-18 LAB
ALBUMIN SERPL ELPH-MCNC: 4 G/DL — SIGNIFICANT CHANGE UP (ref 3.3–5)
ALP SERPL-CCNC: 68 U/L — SIGNIFICANT CHANGE UP (ref 40–120)
ALT FLD-CCNC: 24 U/L — SIGNIFICANT CHANGE UP (ref 4–33)
ANION GAP SERPL CALC-SCNC: 11 MMOL/L — SIGNIFICANT CHANGE UP (ref 7–14)
AST SERPL-CCNC: 22 U/L — SIGNIFICANT CHANGE UP (ref 4–32)
BASOPHILS # BLD AUTO: 0.02 K/UL — SIGNIFICANT CHANGE UP (ref 0–0.2)
BASOPHILS NFR BLD AUTO: 0.2 % — SIGNIFICANT CHANGE UP (ref 0–2)
BILIRUB SERPL-MCNC: 0.2 MG/DL — SIGNIFICANT CHANGE UP (ref 0.2–1.2)
BUN SERPL-MCNC: 8 MG/DL — SIGNIFICANT CHANGE UP (ref 7–23)
CALCIUM SERPL-MCNC: 9.7 MG/DL — SIGNIFICANT CHANGE UP (ref 8.4–10.5)
CHLORIDE SERPL-SCNC: 101 MMOL/L — SIGNIFICANT CHANGE UP (ref 98–107)
CO2 SERPL-SCNC: 24 MMOL/L — SIGNIFICANT CHANGE UP (ref 22–31)
CREAT SERPL-MCNC: 0.68 MG/DL — SIGNIFICANT CHANGE UP (ref 0.5–1.3)
EGFR: 125 ML/MIN/1.73M2 — SIGNIFICANT CHANGE UP
EOSINOPHIL # BLD AUTO: 0.09 K/UL — SIGNIFICANT CHANGE UP (ref 0–0.5)
EOSINOPHIL NFR BLD AUTO: 1.1 % — SIGNIFICANT CHANGE UP (ref 0–6)
GLUCOSE SERPL-MCNC: 97 MG/DL — SIGNIFICANT CHANGE UP (ref 70–99)
HCG SERPL-ACNC: 3647 MIU/ML — SIGNIFICANT CHANGE UP
HCT VFR BLD CALC: 37 % — SIGNIFICANT CHANGE UP (ref 34.5–45)
HGB BLD-MCNC: 12.1 G/DL — SIGNIFICANT CHANGE UP (ref 11.5–15.5)
IANC: 5.32 K/UL — SIGNIFICANT CHANGE UP (ref 1.8–7.4)
IMM GRANULOCYTES NFR BLD AUTO: 0.2 % — SIGNIFICANT CHANGE UP (ref 0–0.9)
LYMPHOCYTES # BLD AUTO: 2.18 K/UL — SIGNIFICANT CHANGE UP (ref 1–3.3)
LYMPHOCYTES # BLD AUTO: 26.9 % — SIGNIFICANT CHANGE UP (ref 13–44)
MCHC RBC-ENTMCNC: 28.6 PG — SIGNIFICANT CHANGE UP (ref 27–34)
MCHC RBC-ENTMCNC: 32.7 GM/DL — SIGNIFICANT CHANGE UP (ref 32–36)
MCV RBC AUTO: 87.5 FL — SIGNIFICANT CHANGE UP (ref 80–100)
MONOCYTES # BLD AUTO: 0.48 K/UL — SIGNIFICANT CHANGE UP (ref 0–0.9)
MONOCYTES NFR BLD AUTO: 5.9 % — SIGNIFICANT CHANGE UP (ref 2–14)
NEUTROPHILS # BLD AUTO: 5.32 K/UL — SIGNIFICANT CHANGE UP (ref 1.8–7.4)
NEUTROPHILS NFR BLD AUTO: 65.7 % — SIGNIFICANT CHANGE UP (ref 43–77)
NRBC # BLD: 0 /100 WBCS — SIGNIFICANT CHANGE UP (ref 0–0)
NRBC # FLD: 0 K/UL — SIGNIFICANT CHANGE UP (ref 0–0)
PLATELET # BLD AUTO: 226 K/UL — SIGNIFICANT CHANGE UP (ref 150–400)
POTASSIUM SERPL-MCNC: 4.3 MMOL/L — SIGNIFICANT CHANGE UP (ref 3.5–5.3)
POTASSIUM SERPL-SCNC: 4.3 MMOL/L — SIGNIFICANT CHANGE UP (ref 3.5–5.3)
PROT SERPL-MCNC: 6.8 G/DL — SIGNIFICANT CHANGE UP (ref 6–8.3)
RBC # BLD: 4.23 M/UL — SIGNIFICANT CHANGE UP (ref 3.8–5.2)
RBC # FLD: 13.2 % — SIGNIFICANT CHANGE UP (ref 10.3–14.5)
SODIUM SERPL-SCNC: 136 MMOL/L — SIGNIFICANT CHANGE UP (ref 135–145)
WBC # BLD: 8.11 K/UL — SIGNIFICANT CHANGE UP (ref 3.8–10.5)
WBC # FLD AUTO: 8.11 K/UL — SIGNIFICANT CHANGE UP (ref 3.8–10.5)

## 2023-02-18 PROCEDURE — 99284 EMERGENCY DEPT VISIT MOD MDM: CPT

## 2023-02-18 PROCEDURE — 76856 US EXAM PELVIC COMPLETE: CPT | Mod: 26

## 2023-02-18 RX ORDER — KETOROLAC TROMETHAMINE 30 MG/ML
15 SYRINGE (ML) INJECTION ONCE
Refills: 0 | Status: DISCONTINUED | OUTPATIENT
Start: 2023-02-18 | End: 2023-02-18

## 2023-02-18 RX ORDER — SODIUM CHLORIDE 9 MG/ML
1000 INJECTION INTRAMUSCULAR; INTRAVENOUS; SUBCUTANEOUS ONCE
Refills: 0 | Status: COMPLETED | OUTPATIENT
Start: 2023-02-18 | End: 2023-02-18

## 2023-02-18 RX ORDER — ACETAMINOPHEN 500 MG
975 TABLET ORAL ONCE
Refills: 0 | Status: COMPLETED | OUTPATIENT
Start: 2023-02-18 | End: 2023-02-18

## 2023-02-18 RX ADMIN — SODIUM CHLORIDE 1000 MILLILITER(S): 9 INJECTION INTRAMUSCULAR; INTRAVENOUS; SUBCUTANEOUS at 17:35

## 2023-02-18 RX ADMIN — Medication 975 MILLIGRAM(S): at 17:35

## 2023-02-18 NOTE — ED PROVIDER NOTE - NS ED ROS FT
General: denies fever, chills  HENT: denies nasal congestion, rhinorrhea  Eyes: denies visual changes, blurred vision  CV: denies chest pain, palpitations  Resp: denies difficulty breathing, cough  Abdominal: + abdominal pain  : vaginal bleeding  MSK: denies muscle aches, leg swelling  Neuro: denies headaches, numbness, tingling  Skin: denies rashes, bruises

## 2023-02-18 NOTE — ED ADULT TRIAGE NOTE - CHIEF COMPLAINT QUOTE
8 weeks pregnant, c/o heavy vaginal bleeding with clots and abd pain since this morning. C/o feeling weak.

## 2023-02-18 NOTE — ED PROVIDER NOTE - ATTENDING CONTRIBUTION TO CARE
Patient presents to the emergency department with continued heavy vaginal bleeding and lower abdominal cramping in the setting of fetal demise and miscarriage.  Patient is hemodynamically stable, abdomen soft with some diffuse lower cramping with tenderness no r/g.  Plan for labs to evaluate H&H for anemia, transvaginal ultrasound to evaluate status of miscarriage. Pt signed out pending work up.

## 2023-02-18 NOTE — ED PROVIDER NOTE - NSFOLLOWUPINSTRUCTIONS_ED_ALL_ED_FT
Discharge instructions:    - Please follow up with your Primary Care Doctor.    - Tylenol up to 650 mg every 4-6 hours as needed for pain and/or Motrin up to 600 mg every 6 hours as needed for pain. Take any prescribed medications as instructed:           SEEK IMMEDIATE MEDICAL CARE IF YOU HAVE ANY OF THE FOLLOWING SYMPTOMS: heavy vaginal bleeding, severe low back or abdominal cramps, fever/chills, or lightheadedness/dizziness. Discharge instructions:    - Please follow up with your OBGYN on 2/22 as previously scheduled     - Tylenol up to 650 mg every 4-6 hours as needed for pain and/or Motrin up to 600 mg every 6 hours as needed for pain. Take any prescribed medications as instructed:           SEEK IMMEDIATE MEDICAL CARE IF YOU HAVE ANY OF THE FOLLOWING SYMPTOMS: heavy vaginal bleeding, severe low back or abdominal cramps, fever/chills, or lightheadedness/dizziness. Discharge instructions:    - Please follow up with your OBGYN on 2/22 as previously scheduled     - Tylenol up to 650 mg every 4-6 hours as needed for pain and/or Motrin up to 600 mg every 6 hours as needed for pain.     SEEK IMMEDIATE MEDICAL CARE IF YOU HAVE ANY OF THE FOLLOWING SYMPTOMS: heavy vaginal bleeding, severe low back or abdominal cramps, fever/chills, or lightheadedness/dizziness.

## 2023-02-18 NOTE — ED PROVIDER NOTE - PHYSICAL EXAMINATION
GENERAL: non-toxic appearing  HEAD: normocephalic, atraumatic  HENT: airway intact  EYES: normal conjunctiva  CARDIAC: regular rate and rhythm, normal S1S2, no appreciable murmurs, 2+ pulses in UE/LE b/l  PULM: normal breath sounds, clear to ascultation bilaterally, no rales, rhonchi, wheezing  GI: abdomen nondistended, soft, lower abdominal TTP; no guarding, rebound tenderness  : refusing pelvic exam at this time  NEURO: no focal motor or sensory deficits  MSK: no peripheral edema  SKIN: well-perfused, extremities warm, no visible rashes

## 2023-02-18 NOTE — ED PROVIDER NOTE - OBJECTIVE STATEMENT
21 yo F w/ no significant pmh coming in w/ vaginal bleeding w/ clots. Patient is approximately 8 weeks pregnant. Was seen 2 days ago at [facility] w/ TVUS concerning for fetal demise. Os at that time was closed. Patient notes worsening lower abdominal pain/cramping but no fevers, nausea or vomiting.

## 2023-02-18 NOTE — ED PROVIDER NOTE - CLINICAL SUMMARY MEDICAL DECISION MAKING FREE TEXT BOX
23 yo F w/ no significant pmh coming in w/ vaginal bleeding w/ clots in the setting of fetal demise; concerns for active miscarry at this time. Unable to quantify extent of bleeding as patient is refusing pelvic exam; will assess labs, hcg and TVUS and provide pain control

## 2023-02-18 NOTE — ED PROVIDER NOTE - PATIENT PORTAL LINK FT
You can access the FollowMyHealth Patient Portal offered by [facility] by registering at the following website: [email or web address]. By joining [email or web address]’s FollowMyHealth portal, you will also be able to view your health information using other applications (apps) compatible with our system.

## 2023-02-22 ENCOUNTER — APPOINTMENT (OUTPATIENT)
Dept: OBGYN | Facility: CLINIC | Age: 23
End: 2023-02-22

## 2023-02-22 VITALS
DIASTOLIC BLOOD PRESSURE: 78 MMHG | RESPIRATION RATE: 15 BRPM | SYSTOLIC BLOOD PRESSURE: 114 MMHG | WEIGHT: 213 LBS | TEMPERATURE: 98.4 F | HEART RATE: 87 BPM | BODY MASS INDEX: 36.37 KG/M2 | HEIGHT: 64 IN | OXYGEN SATURATION: 95 %

## 2023-08-24 NOTE — PRE-OP CHECKLIST - VIA
-- DO NOT REPLY / DO NOT REPLY ALL --  -- Message is from [facility] [employer] ([facility]) --    General Patient Message: Patient is calling because she was seen yesterday 8/23/23 and lost her hearing aid. Patient would like a call back from the office to let her know if the hearing aid has been found.      Caller Information       Type Contact Phone/Fax    08/24/2023 10:23 AM CDT Phone (Incoming) Janette Luke (Self) [phone] (H)        Alternative phone number: [phone]     Can a detailed message be left? Yes    Message Turnaround: [place]:    Refer to site's KB page for routing instructions    Please give this turnaround time to the caller:   \"You can expect to receive a response 1-3 business days after your provider's clinical team reviews the message\"               stretcher

## 2025-04-24 NOTE — ED PROVIDER NOTE - RADIATION
Addended by: JERRY MEJIA on: 1/8/2018 03:52 PM     Modules accepted: Orders     Stable; Continue iron supplementation QOD; use caution to avoid constipation.      no radiation

## 2025-07-15 ENCOUNTER — APPOINTMENT (OUTPATIENT)
Dept: ANTEPARTUM | Facility: CLINIC | Age: 25
End: 2025-07-15